# Patient Record
Sex: FEMALE | Race: WHITE | NOT HISPANIC OR LATINO | Employment: PART TIME | URBAN - METROPOLITAN AREA
[De-identification: names, ages, dates, MRNs, and addresses within clinical notes are randomized per-mention and may not be internally consistent; named-entity substitution may affect disease eponyms.]

---

## 2018-06-25 RX ORDER — ZINC GLUCONATE 50 MG
500 TABLET ORAL DAILY
COMMUNITY
Start: 2014-01-27

## 2018-06-25 RX ORDER — AMLODIPINE BESYLATE 10 MG/1
10 TABLET ORAL DAILY
COMMUNITY
Start: 2014-01-27

## 2018-06-25 RX ORDER — ALPRAZOLAM 0.25 MG/1
0.25 TABLET ORAL DAILY PRN
COMMUNITY
Start: 2014-01-28

## 2018-06-25 RX ORDER — ATORVASTATIN CALCIUM 80 MG/1
80 TABLET, FILM COATED ORAL DAILY
COMMUNITY
Start: 2014-01-27

## 2018-06-25 RX ORDER — CHOLECALCIFEROL (VITAMIN D3) 25 MCG
1000 TABLET ORAL DAILY
COMMUNITY
Start: 2014-01-28

## 2018-06-25 RX ORDER — FLUTICASONE PROPIONATE AND SALMETEROL 250; 50 UG/1; UG/1
2 POWDER RESPIRATORY (INHALATION) 2 TIMES DAILY
COMMUNITY
Start: 2016-05-12 | End: 2022-09-13

## 2018-06-25 RX ORDER — NITROGLYCERIN 0.4 MG/1
0.4 TABLET SUBLINGUAL AS NEEDED
COMMUNITY
Start: 2017-09-07 | End: 2018-10-22 | Stop reason: SDUPTHER

## 2018-06-25 RX ORDER — ISOSORBIDE MONONITRATE 30 MG/1
30 TABLET, EXTENDED RELEASE ORAL DAILY
COMMUNITY
Start: 2015-06-30

## 2018-06-25 RX ORDER — PHENOL/SODIUM PHENOLATE
20 AEROSOL, SPRAY (ML) MUCOUS MEMBRANE DAILY
COMMUNITY
Start: 2015-11-06

## 2018-06-25 RX ORDER — VITAMIN E (DL,TOCOPHERYL ACET) 90 MG
400 CAPSULE ORAL DAILY
COMMUNITY
Start: 2015-01-29 | End: 2019-06-25

## 2018-06-25 RX ORDER — ASPIRIN 81 MG/1
81 TABLET ORAL DAILY
COMMUNITY
Start: 2014-01-27

## 2018-10-23 ENCOUNTER — TELEPHONE (OUTPATIENT)
Dept: SCHEDULING | Facility: CLINIC | Age: 63
End: 2018-10-23

## 2018-10-23 DIAGNOSIS — I25.10 CORONARY ARTERY DISEASE INVOLVING NATIVE CORONARY ARTERY OF NATIVE HEART WITHOUT ANGINA PECTORIS: ICD-10-CM

## 2018-10-23 DIAGNOSIS — I10 ESSENTIAL HYPERTENSION: ICD-10-CM

## 2018-10-23 DIAGNOSIS — Z79.82 LONG TERM (CURRENT) USE OF ASPIRIN: ICD-10-CM

## 2018-10-23 DIAGNOSIS — R79.9 ABNORMAL BLOOD CHEMISTRY: ICD-10-CM

## 2018-10-23 DIAGNOSIS — R74.01 ELEVATED TRANSAMINASE LEVEL: ICD-10-CM

## 2018-10-23 DIAGNOSIS — Z79.899 ENCOUNTER FOR LONG-TERM (CURRENT) USE OF MEDICATIONS: ICD-10-CM

## 2018-10-23 DIAGNOSIS — R74.8 ELEVATED CPK: ICD-10-CM

## 2018-10-23 DIAGNOSIS — E78.00 HYPERCHOLESTEREMIA: Primary | ICD-10-CM

## 2018-10-23 PROBLEM — Z87.891 FORMER TOBACCO USE: Status: ACTIVE | Noted: 2018-10-23

## 2018-10-23 RX ORDER — NITROGLYCERIN 0.4 MG/1
0.4 TABLET SUBLINGUAL AS NEEDED
Qty: 25 TABLET | Refills: 1 | Status: SHIPPED | OUTPATIENT
Start: 2018-10-23 | End: 2021-07-30

## 2018-10-23 NOTE — TELEPHONE ENCOUNTER
Pt is requesting an order for lab work to be added to her chart then mailed to the home on file.      Pt upcoming appt is scheduled for 10/30  ty

## 2018-10-24 NOTE — TELEPHONE ENCOUNTER
Called patient to see what labs she is going to go to.  She thought she went to [facility] but all her other prior labs were [facility] we will therefore do an other slip and send it to her via her email at [email or web address] so she can print it and figure out which labs she is going to.

## 2018-11-19 ENCOUNTER — TELEPHONE (OUTPATIENT)
Dept: SCHEDULING | Facility: CLINIC | Age: 63
End: 2018-11-19

## 2018-11-28 ENCOUNTER — TELEPHONE (OUTPATIENT)
Dept: SCHEDULING | Facility: CLINIC | Age: 63
End: 2018-11-28

## 2018-11-28 NOTE — TELEPHONE ENCOUNTER
Juani pt - pt calling to discuss with Dr. Gloria if she is able to start to use CBD oil for chronic back pain.  Pt states that there may be interaction with her amlodipine (calcium channel blockers)  Please call pt to discuss at [phone]

## 2018-11-29 NOTE — TELEPHONE ENCOUNTER
CBD oil = cannabidiol. Did [employer] interaction analysis for amlodipine, atorvastatin, and cannabidiol and there are no interactions of risk level A or greater identified.      Checked the websites and they report that there are potential interactions if the drug is metabolized through the cytochrome P450 system as this would be more potent than grapefruit juice.  Will review with Dr. Gloria as to what he recommends.      Spoke to patient and she is at her wits end  because she does not want to have surgery and nothing seems to be helping her back.  She also does not want to take many of the narcotic analgesics that will be prescribed through [facility].

## 2018-11-29 NOTE — TELEPHONE ENCOUNTER
Left voicemail message for patient that Dr. Gloria does not think there are any contraindications to using CBD oil as the Lexicomp drug interaction revealed no  Interactions.    She can start slowly  And if she does develop lightheadedness or dizziness she chould check her BP to make sure duane is not dropping. She can report if she starts to develop muscle pains or cramping in her legs.

## 2019-01-24 ENCOUNTER — TELEPHONE (OUTPATIENT)
Dept: SCHEDULING | Facility: CLINIC | Age: 64
End: 2019-01-24

## 2019-01-24 RX ORDER — NITROGLYCERIN 400 UG/1
1 SPRAY ORAL EVERY 5 MIN PRN
Qty: 12 G | Refills: 0 | Status: SHIPPED | OUTPATIENT
Start: 2019-01-24 | End: 2020-01-27 | Stop reason: SDUPTHER

## 2019-01-24 NOTE — TELEPHONE ENCOUNTER
"FYI - Pt of Dr. Gloria, last seen in office 2017. Pt called office as her Nitroglycerin spray  and she likes to keep a bottle with her \"just in case.\" Also keeps sublingual nitroglycerin tablets in home.    I advised pt that I will send Rx for one bottle nitro spray to her local pharmacy, but that she will need to schedule a follow up with Dr. Gloria in office for additional refills. Stated that she has been having issues with her back, and had to cancel appts in July/Oct/2018. She promised she would call our scheduling team to arrange for a follow up with Dr. Gloria in office. Thank you.  "

## 2019-02-19 LAB
ALBUMIN SERPL-MCNC: 4.1 G/DL (ref 3.6–5.1)
ALBUMIN/GLOB SERPL: 1.8 (CALC) (ref 1–2.5)
ALP SERPL-CCNC: 46 U/L (ref 33–130)
ALT SERPL-CCNC: 21 U/L (ref 6–29)
AST SERPL-CCNC: 25 U/L (ref 10–35)
BASOPHILS # BLD AUTO: 60 CELLS/UL (ref 0–200)
BASOPHILS NFR BLD AUTO: 1 %
BILIRUB SERPL-MCNC: 0.6 MG/DL (ref 0.2–1.2)
BUN SERPL-MCNC: 12 MG/DL (ref 7–25)
BUN/CREAT SERPL: NORMAL (CALC) (ref 6–22)
CALCIUM SERPL-MCNC: 9.4 MG/DL (ref 8.6–10.4)
CHLORIDE SERPL-SCNC: 103 MMOL/L (ref 98–110)
CHOLEST SERPL-MCNC: 201 MG/DL
CHOLEST/HDLC SERPL: 2 (CALC)
CK SERPL-CCNC: 83 U/L (ref 29–143)
CO2 SERPL-SCNC: 31 MMOL/L (ref 20–32)
CREAT SERPL-MCNC: 0.69 MG/DL (ref 0.5–0.99)
EOSINOPHIL # BLD AUTO: 132 CELLS/UL (ref 15–500)
EOSINOPHIL NFR BLD AUTO: 2.2 %
ERYTHROCYTE [DISTWIDTH] IN BLOOD BY AUTOMATED COUNT: 12.8 % (ref 11–15)
GLOBULIN SER CALC-MCNC: 2.3 G/DL (CALC) (ref 1.9–3.7)
GLUCOSE SERPL-MCNC: 88 MG/DL (ref 65–99)
HCT VFR BLD AUTO: 44.2 % (ref 35–45)
HDLC SERPL-MCNC: 99 MG/DL
HGB BLD-MCNC: 15.3 G/DL (ref 11.7–15.5)
LDLC SERPL CALC-MCNC: 86 MG/DL (CALC)
LYMPHOCYTES # BLD AUTO: 1806 CELLS/UL (ref 850–3900)
LYMPHOCYTES NFR BLD AUTO: 30.1 %
MCH RBC QN AUTO: 31.5 PG (ref 27–33)
MCHC RBC AUTO-ENTMCNC: 34.6 G/DL (ref 32–36)
MCV RBC AUTO: 91.1 FL (ref 80–100)
MONOCYTES # BLD AUTO: 528 CELLS/UL (ref 200–950)
MONOCYTES NFR BLD AUTO: 8.8 %
NEUTROPHILS # BLD AUTO: 3474 CELLS/UL (ref 1500–7800)
NEUTROPHILS NFR BLD AUTO: 57.9 %
NONHDLC SERPL-MCNC: 102 MG/DL (CALC)
PLATELET # BLD AUTO: 325 THOUSAND/UL (ref 140–400)
PMV BLD REES-ECKER: 9.3 FL (ref 7.5–12.5)
POTASSIUM SERPL-SCNC: 4.1 MMOL/L (ref 3.5–5.3)
PROT SERPL-MCNC: 6.4 G/DL (ref 6.1–8.1)
QUEST EGFR NON-AFR. AMERICAN: 93 ML/MIN/1.73M2
RBC # BLD AUTO: 4.85 MILLION/UL (ref 3.8–5.1)
SODIUM SERPL-SCNC: 139 MMOL/L (ref 135–146)
TRIGL SERPL-MCNC: 71 MG/DL
WBC # BLD AUTO: 6 THOUSAND/UL (ref 3.8–10.8)

## 2019-05-28 ENCOUNTER — TELEPHONE (OUTPATIENT)
Dept: SCHEDULING | Facility: CLINIC | Age: 64
End: 2019-05-28

## 2019-05-28 NOTE — TELEPHONE ENCOUNTER
Pt requesting routine office visit with Dr. Gloria at the [facility] location.    Pt has 3 can in a row, ok to schedule?  Pt states any day is good for her after 1 pm.    Pt can be reached at [phone]

## 2019-06-25 ENCOUNTER — OFFICE VISIT (OUTPATIENT)
Dept: CARDIOLOGY | Facility: CLINIC | Age: 64
End: 2019-06-25
Payer: COMMERCIAL

## 2019-06-25 VITALS
SYSTOLIC BLOOD PRESSURE: 130 MMHG | HEIGHT: 65 IN | HEART RATE: 84 BPM | BODY MASS INDEX: 24.83 KG/M2 | WEIGHT: 149 LBS | RESPIRATION RATE: 15 BRPM | DIASTOLIC BLOOD PRESSURE: 70 MMHG

## 2019-06-25 DIAGNOSIS — I21.3 ST ELEVATION MYOCARDIAL INFARCTION (STEMI), UNSPECIFIED ARTERY (CMS/HCC): ICD-10-CM

## 2019-06-25 DIAGNOSIS — Z91.041 ALLERGY TO INTRAVENOUS CONTRAST MEDIA: ICD-10-CM

## 2019-06-25 DIAGNOSIS — I25.2 OLD MYOCARDIAL INFARCTION: ICD-10-CM

## 2019-06-25 DIAGNOSIS — E78.00 HYPERCHOLESTEROLEMIA: ICD-10-CM

## 2019-06-25 DIAGNOSIS — I25.10 ATHEROSCLEROSIS OF NATIVE CORONARY ARTERY OF NATIVE HEART WITHOUT ANGINA PECTORIS: Primary | ICD-10-CM

## 2019-06-25 DIAGNOSIS — I10 ESSENTIAL HYPERTENSION: ICD-10-CM

## 2019-06-25 DIAGNOSIS — Z87.891 FORMER TOBACCO USE: ICD-10-CM

## 2019-06-25 PROCEDURE — 93000 ELECTROCARDIOGRAM COMPLETE: CPT | Performed by: INTERNAL MEDICINE

## 2019-06-25 PROCEDURE — 99214 OFFICE O/P EST MOD 30 MIN: CPT | Performed by: INTERNAL MEDICINE

## 2019-06-25 ASSESSMENT — ENCOUNTER SYMPTOMS
ABDOMINAL DISTENTION: 0
LIGHT-HEADEDNESS: 0
DIZZINESS: 0
BRUISES/BLEEDS EASILY: 0
ABDOMINAL PAIN: 0
BACK PAIN: 1
BLOOD IN STOOL: 0
HEMATURIA: 0
ANAL BLEEDING: 0

## 2019-06-25 NOTE — LETTER
Rojas Gloria MD, [facility]  Director, Clinical Cardiology -  [facility]  and [facility]  The [employer] Chair in Innovative Cardiology  Clinical Associate Professor of Medicine  [facility]    [facility]  [facility] [address]  [address] [place] [address]    TEL  [phone]  [email or web address]      2019    Jayson London MD   [address] [place] [address]        Re: Melly Liu  : 1955      Dear Dr. London,      I saw Melly Iza in my office today on 2019.    She is a 64-year-old former smoker with coronary artery disease status post 2 small non-STEMI's (3/10 & 12/10) and episode of ST elevation associated coronary spasm with entirely normal coronary arteries and small troponin leak in ('15), hypercholesterolemia, and a history of an iodinated contrast allergy  who presents today for her 18-month follow-up visit.    She reports no chest pain at rest or with physical activities over the last 18 months.    She has subsequently undergone back surgery at [facility] which has improved but not completely and eliminated her back pain.  She is performing water aerobics in her pool at home and still undergoing physical therapy.    She has been taking her atorvastatin regularly without side effects such as myalgias or significant muscle cramping.    I have reviewed the problem list, allergies, medications and social history, and they are up to date in the electronic record as of the current encounter.     ROS: The remainder of the review of systems is negative in detail for significant complaints.    No changes in her medications have been made since the last visit.    Allergies   Allergen Reactions   • Iodine And Iodide Containing Products Shortness of breath and Angioedema      "tolerates IV dye with steroid prep (IV Decadron)   • Ramipril      Other reaction(s): Hypotension   • Shellfish Derived      Other reaction(s):  swelling, throat closing       Physical Exam:  Vital Signs: /70   Pulse 84   Resp 15   Ht 1.651 m (5' 5\")   Wt 67.6 kg (149 lb)   BMI 24.79 kg/m²   General: well-developed, well-nourished, appears well, no acute distress  HEENT: sclera anicteric, conjunctiva pink, no xanthelasma, neck supple, no thyromegaly  Chest: clear to auscultation, symmetrical expansion, no scoliosis   Heart: Apical impulse normally situated , regular rhythm, normal S1, normal S2, no gallops, no murmur  JVP: normal jugular venous pressure, negative AJR  Vascular: carotid pulses: intact bilaterally, no bruit, peripheral pulses: intact bilaterally  Abd: soft, non-tender, no hepatosplenomegaly, abdominal aorta not palpable  Ext: no edema, no clubbing, no cyanosis  Skin: warm, dry, no ecchymoses  Neuro: alert, oriented x 3, normal muscle strength  Psychiatric: normal affect, normal judgment, normal insight and normal memory      LABS:     Ref. Range 2/18/2019 11:37   Sodium Latest Ref Range: 135 - 146 mmol/L 139   Potassium Latest Ref Range: 3.5 - 5.3 mmol/L 4.1   Chloride Latest Ref Range: 98 - 110 mmol/L 103   CO2 Latest Ref Range: 20 - 32 mmol/L 31   BUN Latest Ref Range: 7 - 25 mg/dL 12   Creatinine Latest Ref Range: 0.50 - 0.99 mg/dL 0.69   Glucose Latest Ref Range: 65 - 99 mg/dL 88   Calcium Latest Ref Range: 8.6 - 10.4 mg/dL 9.4   AST (SGOT) Latest Ref Range: 10 - 35 U/L 25   ALT (SGPT) Latest Ref Range: 6 - 29 U/L 21   Alkaline Phosphatase Latest Ref Range: 33 - 130 U/L 46   Total Protein Latest Ref Range: 6.1 - 8.1 g/dL 6.4   Albumin Latest Ref Range: 3.6 - 5.1 g/dL 4.1   Bilirubin, Total Latest Ref Range: 0.2 - 1.2 mg/dL 0.6   eGFR Latest Ref Range: > OR = 60 mL/min/1.73m2 93   Bun/Creatinine Ratio Latest Ref Range: 6 - 22 (calc) NOT APPLICABLE   Cholesterol Latest Ref Range: <200 " mg/dL 201 (H)   Triglycerides Latest Ref Range: <150 mg/dL 71   HDL Latest Ref Range: >50 mg/dL 99   LDL Calculated Latest Units: mg/dL (calc) 86   Non-HDL, Calculated Latest Ref Range: <130 mg/dL (calc) 102   Chol/Hdlc Ratio Latest Ref Range: <5.0 (calc) 2.0   ALBUMIN/GLOBULIN RATIO Latest Ref Range: 1.0 - 2.5 (calc) 1.8   WBC Latest Ref Range: 3.8 - 10.8 Thousand/uL 6.0   RBC Latest Ref Range: 3.80 - 5.10 Million/uL 4.85   Hemoglobin Latest Ref Range: 11.7 - 15.5 g/dL 15.3   Hematocrit Latest Ref Range: 35.0 - 45.0 % 44.2   MCV Latest Ref Range: 80.0 - 100.0 fL 91.1   MCH Latest Ref Range: 27.0 - 33.0 pg 31.5   MCHC Latest Ref Range: 32.0 - 36.0 g/dL 34.6   RDW Latest Ref Range: 11.0 - 15.0 % 12.8   Platelets Latest Ref Range: 140 - 400 Thousand/uL 325   Basophils Latest Units: % 1.0   Creatine Kinase, Total Latest Ref Range: 29 - 143 U/L 83         ECG:  The electrocardiogram obtained today 6/25/19 revealed Sinus  Rhythm at a rate of 84.  Poor R wave progression consistent with lead placement artifact.  When compared with the most recent ECG of 12/29/17 there was no significant change.    Impression And Plan:  Coronary vasospasm-status post non-STEMI’s 3/10, 12/10, 6/15:   I suspect that all of her events were related to episodes of coronary vasospasm with normal coronary arteries. She has been free of recurrent events for the last the last year and a half on her current regimen which will be continued including amlodipine and isosorbide mononitrate. She will also continue aspirin and lipid-lowering therapy.    Hypercholesterolemia:   Her lipids are acceptable in the absence of atherosclerotic findings of her coronary arteries.  She is already on a maximal dose of atorvastatin.    Hypertension:  Blood pressure is under good control. Will continue current medications.    Iodinated contrast allergy:   She tolerated her angiograms with steroid pretreatment. This is noted in her medical record.      No changes were  made in her regimen today.  Listed below you will find the regimen that she will be requested to continue:      Current Outpatient Prescriptions:   •  ALPRAZolam (XANAX) 0.25 mg tablet, Take 0.25 mg by mouth daily as needed., Disp: , Rfl:   •  amLODIPine (NORVASC) 5 mg tablet, Take 5 mg by mouth daily., Disp: , Rfl:   •  aspirin 81 mg enteric coated tablet, Take 81 mg by mouth daily., Disp: , Rfl:   •  atorvastatin (LIPITOR) 80 mg tablet, Take 80 mg by mouth daily., Disp: , Rfl:   •  cholecalciferol, vitamin D3, (cholecalciferol) 1,000 unit tablet, Take 1,000 Units by mouth daily., Disp: , Rfl:   •  fluticasone-salmeterol (ADVAIR DISKUS) 250-50 mcg/dose diskus inhaler, Inhale 2 puffs 2 (two) times a day.  , Disp: , Rfl:   •  isosorbide mononitrate (IMDUR) 30 mg 24 hr tablet, Take 15 mg by mouth daily.  , Disp: , Rfl:   •  magnesium oxide 500 mg capsule, Take 250 mg by mouth daily., Disp: , Rfl:   •  nitroglycerin (NITROLINGUAL) 400 mcg/spray spray, Place 1 spray under the tongue every 5 (five) minutes as needed for chest pain., Disp: 12 g, Rfl: 0  •  nitroglycerin (NITROSTAT) 0.4 mg SL tablet, Place 1 tablet (0.4 mg total) under the tongue as needed for chest pain. Place under the tongue every 5 minutes as needed for chest pain, Disp: 25 tablet, Rfl: 1  •  omeprazole 20 mg tablet, Take 20 mg by mouth daily., Disp: , Rfl:        If there are no new interval cardiovascular problems, I will see her again in 6 months.    I sincerely appreciate the opportunity to participate in the care of your patients. Please do not hesitate to contact me if any questions or problems arise.     With best wishes and warmest personal regards.      Sincerely,        thai Gloria MD, [facility]    This document was generated utilizing voice recognition technology. A reasonable attempt at proofreading has been made to minimize errors but please excuse any typographical errors which may be present. Please call with any questions.

## 2019-06-25 NOTE — PROGRESS NOTES
"Subjective      Patient ID: Melly Couch is a 64 y.o. female.  1955      HPI    The following have been reviewed and updated as appropriate in this visit:  Tobacco  Allergies  Meds  Problems  Med Hx  Surg Hx  Fam Hx  Soc Hx        Review of Systems   Constitutional:        Weight unchanged since her last visit   HENT: Negative for nosebleeds.    Cardiovascular: Negative for chest pain and leg swelling.   Gastrointestinal: Negative for abdominal distention, abdominal pain, anal bleeding and blood in stool.   Genitourinary: Negative for hematuria.   Musculoskeletal: Positive for back pain (improved since back surgery).   Neurological: Negative for dizziness and light-headedness.   Hematological: Does not bruise/bleed easily.       Objective     Vitals:    06/25/19 1339   Pulse: 84   Resp: 15   Weight: 67.6 kg (149 lb)   Height: 1.651 m (5' 5\")     Body mass index is 24.79 kg/m².    Physical Exam    Assessment/Plan   Diagnoses and all orders for this visit:    Atherosclerosis of native coronary artery of native heart without angina pectoris (Primary)    Essential hypertension  -     ECG 12 lead    Old myocardial infarction    ST elevation myocardial infarction (STEMI), unspecified artery (CMS/HCC)    Hypercholesterolemia    Allergy to intravenous contrast media    Former tobacco use        "

## 2019-06-25 NOTE — PROGRESS NOTES
Rojas Gloria MD, [facility]  Director, Clinical Cardiology -  [facility]  and [facility]  The Carlos Perkins III Chair in Innovative Cardiology  Clinical Associate Professor of Medicine  [facility]    [facility]  The [facility] [address]  [address] [facility] [address]    TEL  [phone]  [email or web address]      2019    Jayson London MD   [address] [place] [address]        Re: Melly Liu  : 1955      Dear Dr. London,      I saw Melly Iza in my office today on 2019.    She is a 64-year-old former smoker with coronary artery disease status post 2 small non-STEMI's (3/10 & 12/10) and episode of ST elevation associated coronary spasm with entirely normal coronary arteries and small troponin leak in ('15), hypercholesterolemia, and a history of an iodinated contrast allergy  who presents today for her 18-month follow-up visit.    She reports no chest pain at rest or with physical activities over the last 18 months.    She has subsequently undergone back surgery at [place] which has improved but not completely and eliminated her back pain.  She is performing water aerobics in her pool at home and still undergoing physical therapy.    She has been taking her atorvastatin regularly without side effects such as myalgias or significant muscle cramping.    I have reviewed the problem list, allergies, medications and social history, and they are up to date in the electronic record as of the current encounter.     ROS: The remainder of the review of systems is negative in detail for significant complaints.    No changes in her medications have been made since the last visit.    Allergies   Allergen Reactions   • Iodine And Iodide Containing Products Shortness of breath and Angioedema      "tolerates IV dye with steroid prep (IV Decadron)   • Ramipril      Other reaction(s): Hypotension   • Shellfish Derived      Other reaction(s):  swelling, throat closing       Physical Exam:  Vital Signs: /70   Pulse 84   Resp 15   Ht 1.651 m (5' 5\")   Wt 67.6 kg (149 lb)   BMI 24.79 kg/m²   General: well-developed, well-nourished, appears well, no acute distress  HEENT: sclera anicteric, conjunctiva pink, no xanthelasma, neck supple, no thyromegaly  Chest: clear to auscultation, symmetrical expansion, no scoliosis   Heart: Apical impulse normally situated , regular rhythm, normal S1, normal S2, no gallops, no murmur  JVP: normal jugular venous pressure, negative AJR  Vascular: carotid pulses: intact bilaterally, no bruit, peripheral pulses: intact bilaterally  Abd: soft, non-tender, no hepatosplenomegaly, abdominal aorta not palpable  Ext: no edema, no clubbing, no cyanosis  Skin: warm, dry, no ecchymoses  Neuro: alert, oriented x 3, normal muscle strength  Psychiatric: normal affect, normal judgment, normal insight and normal memory      LABS:     Ref. Range 2/18/2019 11:37   Sodium Latest Ref Range: 135 - 146 mmol/L 139   Potassium Latest Ref Range: 3.5 - 5.3 mmol/L 4.1   Chloride Latest Ref Range: 98 - 110 mmol/L 103   CO2 Latest Ref Range: 20 - 32 mmol/L 31   BUN Latest Ref Range: 7 - 25 mg/dL 12   Creatinine Latest Ref Range: 0.50 - 0.99 mg/dL 0.69   Glucose Latest Ref Range: 65 - 99 mg/dL 88   Calcium Latest Ref Range: 8.6 - 10.4 mg/dL 9.4   AST (SGOT) Latest Ref Range: 10 - 35 U/L 25   ALT (SGPT) Latest Ref Range: 6 - 29 U/L 21   Alkaline Phosphatase Latest Ref Range: 33 - 130 U/L 46   Total Protein Latest Ref Range: 6.1 - 8.1 g/dL 6.4   Albumin Latest Ref Range: 3.6 - 5.1 g/dL 4.1   Bilirubin, Total Latest Ref Range: 0.2 - 1.2 mg/dL 0.6   eGFR Latest Ref Range: > OR = 60 mL/min/1.73m2 93   Bun/Creatinine Ratio Latest Ref Range: 6 - 22 (calc) NOT APPLICABLE   Cholesterol Latest Ref Range: <200 " mg/dL 201 (H)   Triglycerides Latest Ref Range: <150 mg/dL 71   HDL Latest Ref Range: >50 mg/dL 99   LDL Calculated Latest Units: mg/dL (calc) 86   Non-HDL, Calculated Latest Ref Range: <130 mg/dL (calc) 102   Chol/Hdlc Ratio Latest Ref Range: <5.0 (calc) 2.0   ALBUMIN/GLOBULIN RATIO Latest Ref Range: 1.0 - 2.5 (calc) 1.8   WBC Latest Ref Range: 3.8 - 10.8 Thousand/uL 6.0   RBC Latest Ref Range: 3.80 - 5.10 Million/uL 4.85   Hemoglobin Latest Ref Range: 11.7 - 15.5 g/dL 15.3   Hematocrit Latest Ref Range: 35.0 - 45.0 % 44.2   MCV Latest Ref Range: 80.0 - 100.0 fL 91.1   MCH Latest Ref Range: 27.0 - 33.0 pg 31.5   MCHC Latest Ref Range: 32.0 - 36.0 g/dL 34.6   RDW Latest Ref Range: 11.0 - 15.0 % 12.8   Platelets Latest Ref Range: 140 - 400 Thousand/uL 325   Basophils Latest Units: % 1.0   Creatine Kinase, Total Latest Ref Range: 29 - 143 U/L 83         ECG:  The electrocardiogram obtained today 6/25/19 revealed Sinus  Rhythm at a rate of 84.  Poor R wave progression consistent with lead placement artifact.  When compared with the most recent ECG of 12/29/17 there was no significant change.    Impression And Plan:  Coronary vasospasm-status post non-STEMI’s 3/10, 12/10, 6/15:   I suspect that all of her events were related to episodes of coronary vasospasm with normal coronary arteries. She has been free of recurrent events for the last the last year and a half on her current regimen which will be continued including amlodipine and isosorbide mononitrate. She will also continue aspirin and lipid-lowering therapy.    Hypercholesterolemia:   Her lipids are acceptable in the absence of atherosclerotic findings of her coronary arteries.  She is already on a maximal dose of atorvastatin.    Hypertension:  Blood pressure is under good control. Will continue current medications.    Iodinated contrast allergy:   She tolerated her angiograms with steroid pretreatment. This is noted in her medical record.      No changes were  made in her regimen today.  Listed below you will find the regimen that she will be requested to continue:      Current Outpatient Prescriptions:   •  ALPRAZolam (XANAX) 0.25 mg tablet, Take 0.25 mg by mouth daily as needed., Disp: , Rfl:   •  amLODIPine (NORVASC) 5 mg tablet, Take 5 mg by mouth daily., Disp: , Rfl:   •  aspirin 81 mg enteric coated tablet, Take 81 mg by mouth daily., Disp: , Rfl:   •  atorvastatin (LIPITOR) 80 mg tablet, Take 80 mg by mouth daily., Disp: , Rfl:   •  cholecalciferol, vitamin D3, (cholecalciferol) 1,000 unit tablet, Take 1,000 Units by mouth daily., Disp: , Rfl:   •  fluticasone-salmeterol (ADVAIR DISKUS) 250-50 mcg/dose diskus inhaler, Inhale 2 puffs 2 (two) times a day.  , Disp: , Rfl:   •  isosorbide mononitrate (IMDUR) 30 mg 24 hr tablet, Take 15 mg by mouth daily.  , Disp: , Rfl:   •  magnesium oxide 500 mg capsule, Take 250 mg by mouth daily., Disp: , Rfl:   •  nitroglycerin (NITROLINGUAL) 400 mcg/spray spray, Place 1 spray under the tongue every 5 (five) minutes as needed for chest pain., Disp: 12 g, Rfl: 0  •  nitroglycerin (NITROSTAT) 0.4 mg SL tablet, Place 1 tablet (0.4 mg total) under the tongue as needed for chest pain. Place under the tongue every 5 minutes as needed for chest pain, Disp: 25 tablet, Rfl: 1  •  omeprazole 20 mg tablet, Take 20 mg by mouth daily., Disp: , Rfl:        If there are no new interval cardiovascular problems, I will see her again in 6 months.    I sincerely appreciate the opportunity to participate in the care of your patients. Please do not hesitate to contact me if any questions or problems arise.     With best wishes and warmest personal regards.      Sincerely,        thai Gloria MD, [facility]    This document was generated utilizing voice recognition technology. A reasonable attempt at proofreading has been made to minimize errors but please excuse any typographical errors which may be present. Please call with any questions.

## 2020-01-24 NOTE — PROGRESS NOTES
Rojas Gloria MD, [facility]  Director, Clinical Cardiology -  [facility]  and [facility]  The Carlos Perkins III Chair in Innovative Cardiology  Clinical Associate Professor of Medicine  The [facility] HE[facility]  The [facility] [address]  [address], [facility] [place] [address]    TEL  [phone]  [email or web address]      2020      Jayson London MD   [address] [place] [address]        Re: Melly Liu  : 1955      Dear Dr. London,      I saw Melly Iza in my office today on 2020.      She is a 64-year-old former smoker with coronary artery disease status post 2 small non-STEMI's (3/10 & 12/10) and episode of ST elevation associated coronary spasm with entirely normal coronary arteries and small troponin leak in ('15), hypercholesterolemia, and a history of an iodinated contrast allergy  who presents today for her 6-month follow-up visit.    She reports no chest pain at rest or with physical activities over the last 6 months.  She has not experienced any further coronary events over the last 5 years.    She continues to be troubled by back pain and recently returned to the swimming pool to begin exercising routinely.    She has been taking her atorvastatin regularly without side effects such as myalgias or significant muscle cramping.    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    I have reviewed the problem list, allergies, medications and social history, and they are up to date in the electronic record as of the current encounter.     ROS: The remainder of the review of systems is negative in detail for significant complaints.    No changes in her medications have been made since the last visit.    Allergies   Allergen Reactions  "  • Iodine And Iodide Containing Products Shortness of breath and Angioedema     tolerates IV dye with steroid prep (IV Decadron)   • Ramipril      Other reaction(s): Hypotension   • Shellfish Derived      Other reaction(s):  swelling, throat closing       Physical Exam:  Visit Vitals  /82   Pulse 84   Resp 16   Ht 1.676 m (5' 6\")   Wt 69.9 kg (154 lb)   BMI 24.86 kg/m²     General: well-developed, well-nourished, appears well, no acute distress  HEENT: sclera anicteric, conjunctiva pink, no xanthelasma, neck supple, no thyromegaly  Chest: clear to auscultation, symmetrical expansion, no scoliosis   Heart: Apical impulse normally situated , regular rhythm, normal S1, normal S2, no gallops, no murmur  JVP: normal jugular venous pressure, negative AJR  Vascular: carotid pulses: intact bilaterally, no bruit, peripheral pulses: intact bilaterally  Abd: soft, non-tender, no hepatosplenomegaly, abdominal aorta not palpable  Ext: no edema, no clubbing, no cyanosis  Skin: warm, dry, no ecchymoses  Neuro: alert, oriented x 3, normal muscle strength  Psychiatric: normal affect, normal judgment, normal insight and normal memory      LABS:     Ref. Range 2/18/2019 11:37   Sodium Latest Ref Range: 135 - 146 mmol/L 139   Potassium Latest Ref Range: 3.5 - 5.3 mmol/L 4.1   Chloride Latest Ref Range: 98 - 110 mmol/L 103   CO2 Latest Ref Range: 20 - 32 mmol/L 31   BUN Latest Ref Range: 7 - 25 mg/dL 12   Creatinine Latest Ref Range: 0.50 - 0.99 mg/dL 0.69   Glucose Latest Ref Range: 65 - 99 mg/dL 88   Calcium Latest Ref Range: 8.6 - 10.4 mg/dL 9.4   AST (SGOT) Latest Ref Range: 10 - 35 U/L 25   ALT (SGPT) Latest Ref Range: 6 - 29 U/L 21   Alkaline Phosphatase Latest Ref Range: 33 - 130 U/L 46   Total Protein Latest Ref Range: 6.1 - 8.1 g/dL 6.4   Albumin Latest Ref Range: 3.6 - 5.1 g/dL 4.1   Bilirubin, Total Latest Ref Range: 0.2 - 1.2 mg/dL 0.6   eGFR Latest Ref Range: > OR = 60 mL/min/1.73m2 93   Bun/Creatinine Ratio Latest " Ref Range: 6 - 22 (calc) NOT APPLICABLE   Cholesterol Latest Ref Range: <200 mg/dL 201 (H)   Triglycerides Latest Ref Range: <150 mg/dL 71   HDL Latest Ref Range: >50 mg/dL 99   LDL Calculated Latest Units: mg/dL (calc) 86   Non-HDL, Calculated Latest Ref Range: <130 mg/dL (calc) 102   Chol/Hdlc Ratio Latest Ref Range: <5.0 (calc) 2.0   ALBUMIN/GLOBULIN RATIO Latest Ref Range: 1.0 - 2.5 (calc) 1.8   WBC Latest Ref Range: 3.8 - 10.8 Thousand/uL 6.0   RBC Latest Ref Range: 3.80 - 5.10 Million/uL 4.85   Hemoglobin Latest Ref Range: 11.7 - 15.5 g/dL 15.3   Hematocrit Latest Ref Range: 35.0 - 45.0 % 44.2   MCV Latest Ref Range: 80.0 - 100.0 fL 91.1   MCH Latest Ref Range: 27.0 - 33.0 pg 31.5   MCHC Latest Ref Range: 32.0 - 36.0 g/dL 34.6   RDW Latest Ref Range: 11.0 - 15.0 % 12.8   Platelets Latest Ref Range: 140 - 400 Thousand/uL 325   Basophils Latest Units: % 1.0   Creatine Kinase, Total Latest Ref Range: 29 - 143 U/L 83     ECG:      Impression And Plan:  Coronary vasospasm-status post non-STEMI’s 3/10, 12/10, 6/15:   I suspect that all of her events were related to episodes of coronary vasospasm with normal coronary arteries. She has been free of recurrent events for the last the last year and a half on her current regimen which will be continued including amlodipine and isosorbide mononitrate. She will also continue aspirin and lipid-lowering therapy.    Hypercholesterolemia:   Tolerating lipid lowering therapy without significant side effects. Follow-up laboratory studies are due to reexamine the lipid profile and liver function studies. A requisition was provided today to obtain that lab work prior to the return visit. Those results will be reviewed when they are received and the results will be communicated to the patient.    Hypertension:  Her blood pressure is borderline elevated today.   I told her to monitor her blood pressures at home periodically and average systolic and diastolic blood pressures taken  morning and evening for a week. If the average of those pressures exceed 130/80 she should contact this office for further recommendations in regard to blood pressure management.    Iodinated contrast allergy:   She tolerated her angiograms with steroid pretreatment. This is noted in her medical record.    No changes were made in her regimen today.  Listed below you will find the regimen that she will be requested to continue:      Current Outpatient Medications:   •  ALPRAZolam (XANAX) 0.25 mg tablet, Take 0.25 mg by mouth daily as needed., Disp: , Rfl:   •  amLODIPine (NORVASC) 5 mg tablet, Take 5 mg by mouth daily., Disp: , Rfl:   •  aspirin 81 mg enteric coated tablet, Take 81 mg by mouth daily., Disp: , Rfl:   •  atorvastatin (LIPITOR) 80 mg tablet, Take 80 mg by mouth daily., Disp: , Rfl:   •  cholecalciferol, vitamin D3, (cholecalciferol) 1,000 unit tablet, Take 1,000 Units by mouth daily., Disp: , Rfl:   •  isosorbide mononitrate (IMDUR) 30 mg 24 hr tablet, Take 15 mg by mouth daily.  , Disp: , Rfl:   •  magnesium oxide 500 mg capsule, Take 250 mg by mouth daily., Disp: , Rfl:   •  nitroglycerin (NITROLINGUAL) 400 mcg/spray spray, Place 1 spray under the tongue every 5 (five) minutes as needed for chest pain., Disp: 12 g, Rfl: 0  •  omeprazole 20 mg tablet, Take 20 mg by mouth daily., Disp: , Rfl:   •  fluticasone-salmeterol (ADVAIR DISKUS) 250-50 mcg/dose diskus inhaler, Inhale 2 puffs 2 (two) times a day.  , Disp: , Rfl:   •  nitroglycerin (NITROSTAT) 0.4 mg SL tablet, Place 1 tablet (0.4 mg total) under the tongue as needed for chest pain. Place under the tongue every 5 minutes as needed for chest pain (Patient not taking: Reported on 1/27/2020 ), Disp: 25 tablet, Rfl: 1       If there are no new interval cardiovascular problems, I will see her again in 6 months.    I sincerely appreciate the opportunity to participate in the care of your patients. Please do not hesitate to contact me if any questions or  problems arise.     With best wishes and warmest personal regards.      Sincerely,        m  Rojas Gloria MD, [facility]    This document was generated utilizing voice recognition technology. A reasonable attempt at proofreading has been made to minimize errors but please excuse any typographical errors which may be present. Please call with any questions.

## 2020-01-27 ENCOUNTER — OFFICE VISIT (OUTPATIENT)
Dept: CARDIOLOGY | Facility: CLINIC | Age: 65
End: 2020-01-27
Payer: COMMERCIAL

## 2020-01-27 VITALS
DIASTOLIC BLOOD PRESSURE: 82 MMHG | RESPIRATION RATE: 16 BRPM | HEIGHT: 66 IN | SYSTOLIC BLOOD PRESSURE: 140 MMHG | BODY MASS INDEX: 24.75 KG/M2 | HEART RATE: 84 BPM | WEIGHT: 154 LBS

## 2020-01-27 DIAGNOSIS — I10 ESSENTIAL HYPERTENSION: ICD-10-CM

## 2020-01-27 DIAGNOSIS — I25.10 ATHEROSCLEROSIS OF NATIVE CORONARY ARTERY OF NATIVE HEART WITHOUT ANGINA PECTORIS: ICD-10-CM

## 2020-01-27 DIAGNOSIS — E78.00 HYPERCHOLESTEROLEMIA: ICD-10-CM

## 2020-01-27 DIAGNOSIS — I25.2 OLD MYOCARDIAL INFARCTION: ICD-10-CM

## 2020-01-27 DIAGNOSIS — R00.2 PALPITATIONS: Primary | ICD-10-CM

## 2020-01-27 DIAGNOSIS — I21.3 ST ELEVATION MYOCARDIAL INFARCTION (STEMI), UNSPECIFIED ARTERY (CMS/HCC): ICD-10-CM

## 2020-01-27 DIAGNOSIS — Z91.041 ALLERGY TO INTRAVENOUS CONTRAST MEDIA: ICD-10-CM

## 2020-01-27 PROCEDURE — 93000 ELECTROCARDIOGRAM COMPLETE: CPT | Performed by: INTERNAL MEDICINE

## 2020-01-27 PROCEDURE — 99214 OFFICE O/P EST MOD 30 MIN: CPT | Performed by: INTERNAL MEDICINE

## 2020-01-27 RX ORDER — NITROGLYCERIN 400 UG/1
1 SPRAY ORAL EVERY 5 MIN PRN
Qty: 12 G | Refills: 0 | Status: SHIPPED | OUTPATIENT
Start: 2020-01-27 | End: 2020-10-02 | Stop reason: ENTERED-IN-ERROR

## 2020-01-27 NOTE — LETTER
Rojas Gloria MD, [facility]  Director, Clinical Cardiology -  [facility]  and [facility]  The Carlos Perkins III Chair in Innovative Cardiology  Clinical Associate Professor of Medicine  The [facility] HEART [facility]  The Heart [address]  [address] [facility] [place] [address]    TEL  [phone]  [email or web address]      2020      Jayson London MD   [address] [place] [address]        Re: Melly Liu  : 1955      Dear Dr. London,      I saw Melly Iza in my office today on 2020.      She is a 64-year-old former smoker with coronary artery disease status post 2 small non-STEMI's (3/10 & 12/10) and episode of ST elevation associated coronary spasm with entirely normal coronary arteries and small troponin leak in ('15), hypercholesterolemia, and a history of an iodinated contrast allergy  who presents today for her 6-month follow-up visit.    She reports no chest pain at rest or with physical activities over the last 6 months.  She has not experienced any further coronary events over the last 5 years.    She continues to be troubled by back pain and recently returned to the swimming pool to begin exercising routinely.    She has been taking her atorvastatin regularly without side effects such as myalgias or significant muscle cramping.    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    I have reviewed the problem list, allergies, medications and social history, and they are up to date in the electronic record as of the current encounter.     ROS: The remainder of the review of systems is negative in detail for significant complaints.    No changes in her medications have been made since the last visit.    Allergies   Allergen Reactions  "  • Iodine And Iodide Containing Products Shortness of breath and Angioedema     tolerates IV dye with steroid prep (IV Decadron)   • Ramipril      Other reaction(s): Hypotension   • Shellfish Derived      Other reaction(s):  swelling, throat closing       Physical Exam:  Visit Vitals  /82   Pulse 84   Resp 16   Ht 1.676 m (5' 6\")   Wt 69.9 kg (154 lb)   BMI 24.86 kg/m²     General: well-developed, well-nourished, appears well, no acute distress  HEENT: sclera anicteric, conjunctiva pink, no xanthelasma, neck supple, no thyromegaly  Chest: clear to auscultation, symmetrical expansion, no scoliosis   Heart: Apical impulse normally situated , regular rhythm, normal S1, normal S2, no gallops, no murmur  JVP: normal jugular venous pressure, negative AJR  Vascular: carotid pulses: intact bilaterally, no bruit, peripheral pulses: intact bilaterally  Abd: soft, non-tender, no hepatosplenomegaly, abdominal aorta not palpable  Ext: no edema, no clubbing, no cyanosis  Skin: warm, dry, no ecchymoses  Neuro: alert, oriented x 3, normal muscle strength  Psychiatric: normal affect, normal judgment, normal insight and normal memory      LABS:     Ref. Range 2/18/2019 11:37   Sodium Latest Ref Range: 135 - 146 mmol/L 139   Potassium Latest Ref Range: 3.5 - 5.3 mmol/L 4.1   Chloride Latest Ref Range: 98 - 110 mmol/L 103   CO2 Latest Ref Range: 20 - 32 mmol/L 31   BUN Latest Ref Range: 7 - 25 mg/dL 12   Creatinine Latest Ref Range: 0.50 - 0.99 mg/dL 0.69   Glucose Latest Ref Range: 65 - 99 mg/dL 88   Calcium Latest Ref Range: 8.6 - 10.4 mg/dL 9.4   AST (SGOT) Latest Ref Range: 10 - 35 U/L 25   ALT (SGPT) Latest Ref Range: 6 - 29 U/L 21   Alkaline Phosphatase Latest Ref Range: 33 - 130 U/L 46   Total Protein Latest Ref Range: 6.1 - 8.1 g/dL 6.4   Albumin Latest Ref Range: 3.6 - 5.1 g/dL 4.1   Bilirubin, Total Latest Ref Range: 0.2 - 1.2 mg/dL 0.6   eGFR Latest Ref Range: > OR = 60 mL/min/1.73m2 93   Bun/Creatinine Ratio Latest " Ref Range: 6 - 22 (calc) NOT APPLICABLE   Cholesterol Latest Ref Range: <200 mg/dL 201 (H)   Triglycerides Latest Ref Range: <150 mg/dL 71   HDL Latest Ref Range: >50 mg/dL 99   LDL Calculated Latest Units: mg/dL (calc) 86   Non-HDL, Calculated Latest Ref Range: <130 mg/dL (calc) 102   Chol/Hdlc Ratio Latest Ref Range: <5.0 (calc) 2.0   ALBUMIN/GLOBULIN RATIO Latest Ref Range: 1.0 - 2.5 (calc) 1.8   WBC Latest Ref Range: 3.8 - 10.8 Thousand/uL 6.0   RBC Latest Ref Range: 3.80 - 5.10 Million/uL 4.85   Hemoglobin Latest Ref Range: 11.7 - 15.5 g/dL 15.3   Hematocrit Latest Ref Range: 35.0 - 45.0 % 44.2   MCV Latest Ref Range: 80.0 - 100.0 fL 91.1   MCH Latest Ref Range: 27.0 - 33.0 pg 31.5   MCHC Latest Ref Range: 32.0 - 36.0 g/dL 34.6   RDW Latest Ref Range: 11.0 - 15.0 % 12.8   Platelets Latest Ref Range: 140 - 400 Thousand/uL 325   Basophils Latest Units: % 1.0   Creatine Kinase, Total Latest Ref Range: 29 - 143 U/L 83     ECG:      Impression And Plan:  Coronary vasospasm-status post non-STEMI’s 3/10, 12/10, 6/15:   I suspect that all of her events were related to episodes of coronary vasospasm with normal coronary arteries. She has been free of recurrent events for the last the last year and a half on her current regimen which will be continued including amlodipine and isosorbide mononitrate. She will also continue aspirin and lipid-lowering therapy.    Hypercholesterolemia:   Tolerating lipid lowering therapy without significant side effects. Follow-up laboratory studies are due to reexamine the lipid profile and liver function studies. A requisition was provided today to obtain that lab work prior to the return visit. Those results will be reviewed when they are received and the results will be communicated to the patient.    Hypertension:  Her blood pressure is borderline elevated today.   I told her to monitor her blood pressures at home periodically and average systolic and diastolic blood pressures taken  morning and evening for a week. If the average of those pressures exceed 130/80 she should contact this office for further recommendations in regard to blood pressure management.    Iodinated contrast allergy:   She tolerated her angiograms with steroid pretreatment. This is noted in her medical record.    No changes were made in her regimen today.  Listed below you will find the regimen that she will be requested to continue:      Current Outpatient Medications:   •  ALPRAZolam (XANAX) 0.25 mg tablet, Take 0.25 mg by mouth daily as needed., Disp: , Rfl:   •  amLODIPine (NORVASC) 5 mg tablet, Take 5 mg by mouth daily., Disp: , Rfl:   •  aspirin 81 mg enteric coated tablet, Take 81 mg by mouth daily., Disp: , Rfl:   •  atorvastatin (LIPITOR) 80 mg tablet, Take 80 mg by mouth daily., Disp: , Rfl:   •  cholecalciferol, vitamin D3, (cholecalciferol) 1,000 unit tablet, Take 1,000 Units by mouth daily., Disp: , Rfl:   •  isosorbide mononitrate (IMDUR) 30 mg 24 hr tablet, Take 15 mg by mouth daily.  , Disp: , Rfl:   •  magnesium oxide 500 mg capsule, Take 250 mg by mouth daily., Disp: , Rfl:   •  nitroglycerin (NITROLINGUAL) 400 mcg/spray spray, Place 1 spray under the tongue every 5 (five) minutes as needed for chest pain., Disp: 12 g, Rfl: 0  •  omeprazole 20 mg tablet, Take 20 mg by mouth daily., Disp: , Rfl:   •  fluticasone-salmeterol (ADVAIR DISKUS) 250-50 mcg/dose diskus inhaler, Inhale 2 puffs 2 (two) times a day.  , Disp: , Rfl:   •  nitroglycerin (NITROSTAT) 0.4 mg SL tablet, Place 1 tablet (0.4 mg total) under the tongue as needed for chest pain. Place under the tongue every 5 minutes as needed for chest pain (Patient not taking: Reported on 1/27/2020 ), Disp: 25 tablet, Rfl: 1       If there are no new interval cardiovascular problems, I will see her again in 6 months.    I sincerely appreciate the opportunity to participate in the care of your patients. Please do not hesitate to contact me if any questions or  problems arise.     With best wishes and warmest personal regards.      Sincerely,        m  Rojas Gloria MD, [facility]    This document was generated utilizing voice recognition technology. A reasonable attempt at proofreading has been made to minimize errors but please excuse any typographical errors which may be present. Please call with any questions.

## 2020-09-02 LAB
ALBUMIN SERPL-MCNC: 4.4 G/DL (ref 3.6–5.1)
ALBUMIN SERPL-MCNC: 4.4 G/DL (ref 3.6–5.1)
ALBUMIN/GLOB SERPL: 2.2 (CALC) (ref 1–2.5)
ALBUMIN/GLOB SERPL: 2.2 (CALC) (ref 1–2.5)
ALP SERPL-CCNC: 44 U/L (ref 37–153)
ALP SERPL-CCNC: 44 U/L (ref 37–153)
ALT SERPL-CCNC: 15 U/L (ref 6–29)
ALT SERPL-CCNC: 16 U/L (ref 6–29)
AST SERPL-CCNC: 22 U/L (ref 10–35)
AST SERPL-CCNC: 22 U/L (ref 10–35)
BILIRUB DIRECT SERPL-MCNC: 0.1 MG/DL
BILIRUB INDIRECT SERPL-MCNC: 0.5 MG/DL (CALC) (ref 0.2–1.2)
BILIRUB SERPL-MCNC: 0.6 MG/DL (ref 0.2–1.2)
BILIRUB SERPL-MCNC: 0.6 MG/DL (ref 0.2–1.2)
BUN SERPL-MCNC: 8 MG/DL (ref 7–25)
BUN/CREAT SERPL: NORMAL (CALC) (ref 6–22)
CALCIUM SERPL-MCNC: 9.3 MG/DL (ref 8.6–10.4)
CHLORIDE SERPL-SCNC: 100 MMOL/L (ref 98–110)
CHOLEST SERPL-MCNC: 216 MG/DL
CHOLEST/HDLC SERPL: 2.2 (CALC)
CK SERPL-CCNC: 76 U/L (ref 29–143)
CO2 SERPL-SCNC: 29 MMOL/L (ref 20–32)
CREAT SERPL-MCNC: 0.64 MG/DL (ref 0.5–0.99)
GLOBULIN SER CALC-MCNC: 2 G/DL (CALC) (ref 1.9–3.7)
GLOBULIN SER CALC-MCNC: 2 G/DL (CALC) (ref 1.9–3.7)
GLUCOSE SERPL-MCNC: 86 MG/DL (ref 65–99)
HDLC SERPL-MCNC: 98 MG/DL
LDLC SERPL CALC-MCNC: 100 MG/DL (CALC)
NONHDLC SERPL-MCNC: 118 MG/DL (CALC)
POTASSIUM SERPL-SCNC: 4.5 MMOL/L (ref 3.5–5.3)
PROT SERPL-MCNC: 6.4 G/DL (ref 6.1–8.1)
PROT SERPL-MCNC: 6.4 G/DL (ref 6.1–8.1)
QUEST EGFR NON-AFR. AMERICAN: 94 ML/MIN/1.73M2
SODIUM SERPL-SCNC: 136 MMOL/L (ref 135–146)
TRIGL SERPL-MCNC: 90 MG/DL

## 2020-09-17 ENCOUNTER — TELEPHONE (OUTPATIENT)
Dept: CARDIOLOGY | Facility: CLINIC | Age: 65
End: 2020-09-17

## 2020-09-17 NOTE — TELEPHONE ENCOUNTER
*left vm to confirm 9.18.20 in office appt. asked to please call to confirm, verify demos and complete travel screening…ANNMARIE 9.17.20

## 2020-10-01 NOTE — PROGRESS NOTES
Rojas Gloria MD, [facility]  Director, Clinical Cardiology -  [facility]  and Main [facility]  The Carlos Perkins III Chair in Innovative Cardiology  Clinical Associate Professor of Medicine  The [facility] HEART [facility]  [facility]  Jaimeanine Level  [address] [place] [address]    TEL  [phone]  [email or web address]      2020      Jayson London MD   [facility] Medicine   [address] [facility] [place] [address]      Melo Burkett MD  Cardiology  [address] [facility] [place] [address] [phone]    Re: Melly Liu  : 1955      Dear Colleagues,      I saw Melly Couch in my office today on 2020.      She is a 65-year-old former smoker with coronary artery disease status post 2 small non-STEMI's (3/10 & 12/10) and episode of ST elevation associated coronary spasm with entirely normal coronary arteries and small troponin leak in ('15), hypercholesterolemia, and a history of an iodinated contrast allergy  who presents today for her 7-month follow-up visit.    She reports no chest pain at rest or with physical activities over the last 6 months.  She has not experienced any further coronary events over the last 5 1/2 years.    She continues to be troubled by back pain and recently returned to the swimming pool recently after it was reopened following closure due to the COVID-19 pandemic at the swimming for about 30 minutes 3 times.    On one occasion she experienced a sensation of transient fuzziness which had occurred prior to her previous non-STEMI events but this episode was not followed by any jaw or chest discomfort.    She has been taking her atorvastatin regularly without side effects such as myalgias or significant muscle cramping.    Her blood pressure has been increasing gradually prompting valentina Neil  "cardiologist that she sees in [place], to increase her amlodipine dose to 10 mg a day which she has been tolerating.    I have reviewed the problem list, allergies, medications and social history, and they are up to date in the electronic record as of the current encounter.     ROS: The remainder of the review of systems is negative in detail for significant complaints.    No changes in her medications have been made since the last visit.    Allergies   Allergen Reactions   • Iodine And Iodide Containing Products Shortness of breath and Angioedema     tolerates IV dye with steroid prep (IV Decadron)   • Shellfish Derived      Other reaction(s):  swelling, throat closing   • Ramipril      Other reaction(s): Hypotension       Physical Exam:  Visit Vitals  BP (!) 135/95   Pulse 100   Resp 16   Ht 1.676 m (5' 6\")   Wt 68.5 kg (151 lb)   BMI 24.37 kg/m²     General: well-developed, well-nourished, appears well, no acute distress  HEENT: sclera anicteric, conjunctiva pink, no xanthelasma, neck supple, no thyromegaly  Chest: clear to auscultation, symmetrical expansion, no scoliosis   Heart: Apical impulse normally situated , regular rhythm, normal S1, normal S2, no gallops, no murmur  JVP: normal jugular venous pressure, negative AJR  Vascular: carotid pulses: intact bilaterally, no bruit, peripheral pulses: intact bilaterally  Abd: soft, non-tender, no hepatosplenomegaly, abdominal aorta not palpable  Ext: no edema, no clubbing, no cyanosis  Skin: warm, dry, no ecchymoses  Neuro: alert, oriented x 3, normal muscle strength  Psychiatric: normal affect, normal judgment, normal insight and normal memory      LABS:     Ref. Range 9/1/2020 11:45 9/1/2020 11:48   Sodium Latest Ref Range: 135 - 146 mmol/L  136   Potassium Latest Ref Range: 3.5 - 5.3 mmol/L  4.5   Chloride Latest Ref Range: 98 - 110 mmol/L  100   CO2 Latest Ref Range: 20 - 32 mmol/L  29   BUN Latest Ref Range: 7 - 25 mg/dL  8   Creatinine Latest Ref Range: " 0.50 - 0.99 mg/dL  0.64   Glucose Latest Ref Range: 65 - 99 mg/dL  86   Calcium Latest Ref Range: 8.6 - 10.4 mg/dL  9.3   AST (SGOT) Latest Ref Range: 10 - 35 U/L 22 22   ALT (SGPT) Latest Ref Range: 6 - 29 U/L 15 16   Alkaline Phosphatase Latest Ref Range: 37 - 153 U/L 44 44   Total Protein Latest Ref Range: 6.1 - 8.1 g/dL 6.4 6.4   Albumin Latest Ref Range: 3.6 - 5.1 g/dL 4.4 4.4   Bilirubin, Total Latest Ref Range: 0.2 - 1.2 mg/dL 0.6 0.6   Bilirubin, Direct Latest Ref Range: < OR = 0.2 mg/dL 0.1    eGFR Latest Ref Range: > OR = 60 mL/min/1.73m2  94   Bun/Creatinine Ratio Latest Ref Range: 6 - 22 (calc)  NOT APPLICABLE   Globulin Latest Ref Range: 1.9 - 3.7 g/dL (calc) 2.0 2.0   Cholesterol Latest Ref Range: <200 mg/dL 216 (H)    Triglycerides Latest Ref Range: <150 mg/dL 90    HDL Latest Ref Range: > OR = 50 mg/dL 98    LDL Calculated Latest Units: mg/dL (calc) 100 (H)    Non-HDL, Calculated Latest Ref Range: <130 mg/dL (calc) 118    Chol/Hdlc Ratio Latest Ref Range: <5.0 (calc) 2.2    ALBUMIN/GLOBULIN RATIO Latest Ref Range: 1.0 - 2.5 (calc) 2.2 2.2   Bilirubin, Indirect Latest Ref Range: 0.2 - 1.2 mg/dL (calc) 0.5    Creatine Kinase, Total Latest Ref Range: 29 - 143 U/L  76       ECG:      Impression And Plan:  Coronary vasospasm-status post non-STEMI’s 3/10, 12/10, 6/15:   I suspect that all of her events were related to episodes of coronary vasospasm with normal coronary arteries. She has been free of recurrent events for the last the 5-1/2 years on her current regimen which will be continued including amlodipine and isosorbide mononitrate. She will also continue aspirin and lipid-lowering therapy.    Hypercholesterolemia:   Although her LDL is 100, her HDL is 98 and she had no evidence of any coronary atherosclerosis on her coronary angiograms.    Hypertension:  Her blood pressure remains elevated today.  I added 25 mg of losartan to her regimen.    Iodinated contrast allergy:   She tolerated her angiograms  with steroid pretreatment. This is noted in her medical record.    No changes were made in her regimen today.  Listed below you will find the regimen that she will be requested to continue:      Current Outpatient Medications:   •  albuterol HFA (VENTOLIN HFA) 90 mcg/actuation inhaler, Inhale 2 puffs every 6 (six) hours as needed for shortness of breath.  , Disp: , Rfl:   •  ALPRAZolam (XANAX) 0.25 mg tablet, Take 0.25 mg by mouth daily as needed., Disp: , Rfl:   •  amLODIPine (NORVASC) 5 mg tablet, Take 10 mg by mouth daily.  , Disp: , Rfl:   •  aspirin 81 mg enteric coated tablet, Take 81 mg by mouth daily., Disp: , Rfl:   •  atorvastatin (LIPITOR) 80 mg tablet, Take 80 mg by mouth daily., Disp: , Rfl:   •  cholecalciferol, vitamin D3, (cholecalciferol) 1,000 unit tablet, Take 1,000 Units by mouth daily., Disp: , Rfl:   •  fluticasone-salmeterol (ADVAIR DISKUS) 250-50 mcg/dose diskus inhaler, Inhale 2 puffs 2 (two) times a day.  , Disp: , Rfl:   •  isosorbide mononitrate (IMDUR) 30 mg 24 hr tablet, Take 15 mg by mouth daily.  , Disp: , Rfl:   •  lansoprazole (PREVACID) 30 mg capsule, Take 30 mg by mouth once daily., Disp: , Rfl:   •  magnesium oxide 500 mg capsule, Take 250 mg by mouth daily., Disp: , Rfl:   •  nitroglycerin (NITROLINGUAL) 400 mcg/spray spray, Place 1 spray under the tongue every 5 (five) minutes as needed for chest pain., Disp: , Rfl:   •  nitroglycerin (NITROSTAT) 0.4 mg SL tablet, Place 1 tablet (0.4 mg total) under the tongue as needed for chest pain. Place under the tongue every 5 minutes as needed for chest pain, Disp: 25 tablet, Rfl: 1  •  omeprazole 20 mg tablet, Take 20 mg by mouth daily., Disp: , Rfl:   •  vitamin B complex 100  2-herbs 100 mg tablet, Take 1 tablet by mouth once daily., Disp: , Rfl:        I will be retiring at the end of 2020. I have suggested that she continue her cardiologic care with Nirav Don MD, one of my colleagues in the [facility].  A follow-up  appointment will be arranged for her to see him in about 6 months.    I have sincerely appreciated the opportunity to participate in the care of your patients. Please do not hesitate to contact me if any questions or problems arise until my prison in mid December 2020.     With best wishes and warmest personal regards.      Sincerely,        m  Rojas Gloria MD, [facility]    This document was generated utilizing voice recognition technology. A reasonable attempt at proofreading has been made to minimize errors but please excuse any typographical errors which may be present. Please call with any questions.

## 2020-10-02 ENCOUNTER — OFFICE VISIT (OUTPATIENT)
Dept: CARDIOLOGY | Facility: CLINIC | Age: 65
End: 2020-10-02
Payer: COMMERCIAL

## 2020-10-02 VITALS
HEART RATE: 100 BPM | DIASTOLIC BLOOD PRESSURE: 95 MMHG | HEIGHT: 66 IN | BODY MASS INDEX: 24.27 KG/M2 | WEIGHT: 151 LBS | SYSTOLIC BLOOD PRESSURE: 135 MMHG | RESPIRATION RATE: 16 BRPM

## 2020-10-02 DIAGNOSIS — E78.00 HYPERCHOLESTEROLEMIA: Chronic | ICD-10-CM

## 2020-10-02 DIAGNOSIS — I25.2 OLD MYOCARDIAL INFARCTION: Chronic | ICD-10-CM

## 2020-10-02 DIAGNOSIS — I25.10 ATHEROSCLEROSIS OF NATIVE CORONARY ARTERY OF NATIVE HEART WITHOUT ANGINA PECTORIS: Primary | Chronic | ICD-10-CM

## 2020-10-02 DIAGNOSIS — Z91.041 ALLERGY TO INTRAVENOUS CONTRAST MEDIA: Chronic | ICD-10-CM

## 2020-10-02 DIAGNOSIS — Z87.891 FORMER TOBACCO USE: Chronic | ICD-10-CM

## 2020-10-02 DIAGNOSIS — J45.909 ASTHMA, UNSPECIFIED ASTHMA SEVERITY, UNSPECIFIED WHETHER COMPLICATED, UNSPECIFIED WHETHER PERSISTENT: Chronic | ICD-10-CM

## 2020-10-02 DIAGNOSIS — I21.3 ST ELEVATION MYOCARDIAL INFARCTION (STEMI), UNSPECIFIED ARTERY (CMS/HCC): Chronic | ICD-10-CM

## 2020-10-02 PROCEDURE — 99214 OFFICE O/P EST MOD 30 MIN: CPT | Performed by: INTERNAL MEDICINE

## 2020-10-02 PROCEDURE — 93000 ELECTROCARDIOGRAM COMPLETE: CPT | Performed by: INTERNAL MEDICINE

## 2020-10-02 RX ORDER — LOSARTAN POTASSIUM 25 MG/1
25 TABLET ORAL DAILY
Qty: 90 TABLET | Refills: 5 | Status: SHIPPED | OUTPATIENT
Start: 2020-10-02 | End: 2024-10-31

## 2020-10-02 RX ORDER — DULOXETIN HYDROCHLORIDE 30 MG/1
30 CAPSULE, DELAYED RELEASE ORAL DAILY
COMMUNITY
End: 2020-10-02 | Stop reason: ENTERED-IN-ERROR

## 2020-10-02 RX ORDER — NITROGLYCERIN 400 UG/1
1 SPRAY ORAL EVERY 5 MIN PRN
COMMUNITY
End: 2020-10-02 | Stop reason: SDUPTHER

## 2020-10-02 RX ORDER — LANSOPRAZOLE 30 MG/1
30 CAPSULE, DELAYED RELEASE ORAL DAILY
COMMUNITY
End: 2022-09-13

## 2020-10-02 RX ORDER — METOPROLOL SUCCINATE 25 MG/1
12.5 TABLET, EXTENDED RELEASE ORAL DAILY
COMMUNITY
End: 2020-10-02 | Stop reason: ENTERED-IN-ERROR

## 2020-10-02 RX ORDER — ALBUTEROL SULFATE 90 UG/1
2 INHALANT RESPIRATORY (INHALATION) EVERY 6 HOURS PRN
COMMUNITY
Start: 2020-07-16

## 2020-10-02 RX ORDER — NITROGLYCERIN 400 UG/1
1 SPRAY ORAL EVERY 5 MIN PRN
Qty: 12 G | Refills: 4 | Status: SHIPPED | OUTPATIENT
Start: 2020-10-02 | End: 2021-07-30 | Stop reason: SDUPTHER

## 2020-10-02 ASSESSMENT — ENCOUNTER SYMPTOMS
DIZZINESS: 0
PALPITATIONS: 0
BLOOD IN STOOL: 0
ABDOMINAL PAIN: 0
FATIGUE: 0
SPEECH DIFFICULTY: 0
FEVER: 0
ANAL BLEEDING: 0
NERVOUS/ANXIOUS: 1
FACIAL ASYMMETRY: 0
LIGHT-HEADEDNESS: 0
HEMATURIA: 0
MYALGIAS: 0
CHILLS: 0
BRUISES/BLEEDS EASILY: 1
WHEEZING: 0
APNEA: 0
COUGH: 0
SHORTNESS OF BREATH: 0

## 2020-10-02 NOTE — LETTER
Rojas Gloria MD, [facility]  Director, Clinical Cardiology -  [facility]  and Main [facility]  The Carlos Perkins III Chair in Innovative Cardiology  Clinical Associate Professor of Medicine  The [facility] HEART [facility]  [facility]  Jaimeanine Level  [address] [place] [address]    TEL  [phone]  [email or web address]      2020      Jayson London MD   [facility] Medicine   [address] [place] [address]      Melo Burkett MD  Cardiology  [address] [place] [address] [phone]    Re: Melly Liu  : 1955      Dear Colleagues,      I saw Melly Couch in my office today on 2020.      She is a 65-year-old former smoker with coronary artery disease status post 2 small non-STEMI's (3/10 & 12/10) and episode of ST elevation associated coronary spasm with entirely normal coronary arteries and small troponin leak in ('15), hypercholesterolemia, and a history of an iodinated contrast allergy  who presents today for her 7-month follow-up visit.    She reports no chest pain at rest or with physical activities over the last 6 months.  She has not experienced any further coronary events over the last 5 1/2 years.    She continues to be troubled by back pain and recently returned to the swimming pool recently after it was reopened following closure due to the COVID-19 pandemic at the swimming for about 30 minutes 3 times.    On one occasion she experienced a sensation of transient fuzziness which had occurred prior to her previous non-STEMI events but this episode was not followed by any jaw or chest discomfort.    She has been taking her atorvastatin regularly without side effects such as myalgias or significant muscle cramping.    Her blood pressure has been increasing gradually prompting valentina Neil  "cardiologist that she sees in [place], to increase her amlodipine dose to 10 mg a day which she has been tolerating.    I have reviewed the problem list, allergies, medications and social history, and they are up to date in the electronic record as of the current encounter.     ROS: The remainder of the review of systems is negative in detail for significant complaints.    No changes in her medications have been made since the last visit.    Allergies   Allergen Reactions   • Iodine And Iodide Containing Products Shortness of breath and Angioedema     tolerates IV dye with steroid prep (IV Decadron)   • Shellfish Derived      Other reaction(s):  swelling, throat closing   • Ramipril      Other reaction(s): Hypotension       Physical Exam:  Visit Vitals  BP (!) 135/95   Pulse 100   Resp 16   Ht 1.676 m (5' 6\")   Wt 68.5 kg (151 lb)   BMI 24.37 kg/m²     General: well-developed, well-nourished, appears well, no acute distress  HEENT: sclera anicteric, conjunctiva pink, no xanthelasma, neck supple, no thyromegaly  Chest: clear to auscultation, symmetrical expansion, no scoliosis   Heart: Apical impulse normally situated , regular rhythm, normal S1, normal S2, no gallops, no murmur  JVP: normal jugular venous pressure, negative AJR  Vascular: carotid pulses: intact bilaterally, no bruit, peripheral pulses: intact bilaterally  Abd: soft, non-tender, no hepatosplenomegaly, abdominal aorta not palpable  Ext: no edema, no clubbing, no cyanosis  Skin: warm, dry, no ecchymoses  Neuro: alert, oriented x 3, normal muscle strength  Psychiatric: normal affect, normal judgment, normal insight and normal memory      LABS:     Ref. Range 9/1/2020 11:45 9/1/2020 11:48   Sodium Latest Ref Range: 135 - 146 mmol/L  136   Potassium Latest Ref Range: 3.5 - 5.3 mmol/L  4.5   Chloride Latest Ref Range: 98 - 110 mmol/L  100   CO2 Latest Ref Range: 20 - 32 mmol/L  29   BUN Latest Ref Range: 7 - 25 mg/dL  8   Creatinine Latest Ref Range: " 0.50 - 0.99 mg/dL  0.64   Glucose Latest Ref Range: 65 - 99 mg/dL  86   Calcium Latest Ref Range: 8.6 - 10.4 mg/dL  9.3   AST (SGOT) Latest Ref Range: 10 - 35 U/L 22 22   ALT (SGPT) Latest Ref Range: 6 - 29 U/L 15 16   Alkaline Phosphatase Latest Ref Range: 37 - 153 U/L 44 44   Total Protein Latest Ref Range: 6.1 - 8.1 g/dL 6.4 6.4   Albumin Latest Ref Range: 3.6 - 5.1 g/dL 4.4 4.4   Bilirubin, Total Latest Ref Range: 0.2 - 1.2 mg/dL 0.6 0.6   Bilirubin, Direct Latest Ref Range: < OR = 0.2 mg/dL 0.1    eGFR Latest Ref Range: > OR = 60 mL/min/1.73m2  94   Bun/Creatinine Ratio Latest Ref Range: 6 - 22 (calc)  NOT APPLICABLE   Globulin Latest Ref Range: 1.9 - 3.7 g/dL (calc) 2.0 2.0   Cholesterol Latest Ref Range: <200 mg/dL 216 (H)    Triglycerides Latest Ref Range: <150 mg/dL 90    HDL Latest Ref Range: > OR = 50 mg/dL 98    LDL Calculated Latest Units: mg/dL (calc) 100 (H)    Non-HDL, Calculated Latest Ref Range: <130 mg/dL (calc) 118    Chol/Hdlc Ratio Latest Ref Range: <5.0 (calc) 2.2    ALBUMIN/GLOBULIN RATIO Latest Ref Range: 1.0 - 2.5 (calc) 2.2 2.2   Bilirubin, Indirect Latest Ref Range: 0.2 - 1.2 mg/dL (calc) 0.5    Creatine Kinase, Total Latest Ref Range: 29 - 143 U/L  76       ECG:      Impression And Plan:  Coronary vasospasm-status post non-STEMI’s 3/10, 12/10, 6/15:   I suspect that all of her events were related to episodes of coronary vasospasm with normal coronary arteries. She has been free of recurrent events for the last the 5-1/2 years on her current regimen which will be continued including amlodipine and isosorbide mononitrate. She will also continue aspirin and lipid-lowering therapy.    Hypercholesterolemia:   Although her LDL is 100, her HDL is 98 and she had no evidence of any coronary atherosclerosis on her coronary angiograms.    Hypertension:  Her blood pressure remains elevated today.  I added 25 mg of losartan to her regimen.    Iodinated contrast allergy:   She tolerated her angiograms  with steroid pretreatment. This is noted in her medical record.    No changes were made in her regimen today.  Listed below you will find the regimen that she will be requested to continue:      Current Outpatient Medications:   •  albuterol HFA (VENTOLIN HFA) 90 mcg/actuation inhaler, Inhale 2 puffs every 6 (six) hours as needed for shortness of breath.  , Disp: , Rfl:   •  ALPRAZolam (XANAX) 0.25 mg tablet, Take 0.25 mg by mouth daily as needed., Disp: , Rfl:   •  amLODIPine (NORVASC) 5 mg tablet, Take 10 mg by mouth daily.  , Disp: , Rfl:   •  aspirin 81 mg enteric coated tablet, Take 81 mg by mouth daily., Disp: , Rfl:   •  atorvastatin (LIPITOR) 80 mg tablet, Take 80 mg by mouth daily., Disp: , Rfl:   •  cholecalciferol, vitamin D3, (cholecalciferol) 1,000 unit tablet, Take 1,000 Units by mouth daily., Disp: , Rfl:   •  fluticasone-salmeterol (ADVAIR DISKUS) 250-50 mcg/dose diskus inhaler, Inhale 2 puffs 2 (two) times a day.  , Disp: , Rfl:   •  isosorbide mononitrate (IMDUR) 30 mg 24 hr tablet, Take 15 mg by mouth daily.  , Disp: , Rfl:   •  lansoprazole (PREVACID) 30 mg capsule, Take 30 mg by mouth once daily., Disp: , Rfl:   •  magnesium oxide 500 mg capsule, Take 250 mg by mouth daily., Disp: , Rfl:   •  nitroglycerin (NITROLINGUAL) 400 mcg/spray spray, Place 1 spray under the tongue every 5 (five) minutes as needed for chest pain., Disp: , Rfl:   •  nitroglycerin (NITROSTAT) 0.4 mg SL tablet, Place 1 tablet (0.4 mg total) under the tongue as needed for chest pain. Place under the tongue every 5 minutes as needed for chest pain, Disp: 25 tablet, Rfl: 1  •  omeprazole 20 mg tablet, Take 20 mg by mouth daily., Disp: , Rfl:   •  vitamin B complex 100  2-herbs 100 mg tablet, Take 1 tablet by mouth once daily., Disp: , Rfl:        I will be retiring at the end of 2020. I have suggested that she continue her cardiologic care with Nirav Don MD, one of my colleagues in the [facility].  A follow-up  appointment will be arranged for her to see him in about 6 months.    I have sincerely appreciated the opportunity to participate in the care of your patients. Please do not hesitate to contact me if any questions or problems arise until my [facility] in mid December 2020.     With best wishes and warmest personal regards.      Sincerely,        m  Rojas Gloria MD, [facility]    This document was generated utilizing voice recognition technology. A reasonable attempt at proofreading has been made to minimize errors but please excuse any typographical errors which may be present. Please call with any questions.

## 2020-10-02 NOTE — PROGRESS NOTES
"      Subjective      Patient ID: Melly Couch is a 65 y.o. female.  1955      Her exercise had consisted of swimming for 30 laps nightly until the gym opened. She is now swimming for 30 minutes 3 times a week.      The following have been reviewed and updated as appropriate in this visit:  Tobacco  Allergies  Meds  Problems  Med Hx  Surg Hx  Fam Hx  Soc Hx        Review of Systems   Constitutional: Negative for chills, fatigue and fever.        Weight decreased 3 lbs since her last visit   HENT: Positive for nosebleeds (severe nosebleed in February - ER visit for).    Respiratory: Negative for apnea, cough, shortness of breath and wheezing.         No snoring   Cardiovascular: Negative for chest pain, palpitations and leg swelling.        No orthopnea or PND   Gastrointestinal: Negative for abdominal pain, anal bleeding and blood in stool.   Genitourinary: Negative for hematuria.   Musculoskeletal: Negative for myalgias.   Neurological: Negative for dizziness, syncope, facial asymmetry, speech difficulty and light-headedness.   Hematological: Bruises/bleeds easily (bruising).   Psychiatric/Behavioral: The patient is nervous/anxious (anxietu=shelby witthmental confusion but no chest pain - HR was 120 bpm - saw local cardiologist and ECG OK (previos events have occurred with these sxs initially).        Objective     Vitals:    10/02/20 1048   Pulse: 100   Resp: 16   Weight: 68.5 kg (151 lb)   Height: 1.676 m (5' 6\")     Body mass index is 24.37 kg/m².    Physical Exam    Assessment/Plan   Diagnoses and all orders for this visit:    Atherosclerosis of native coronary artery of native heart without angina pectoris (Primary)    Old myocardial infarction    ST elevation myocardial infarction (STEMI), unspecified artery (CMS/HCC)  -     ECG 12 LEAD-OFFICE PERFORMED    Hypercholesterolemia    Former tobacco use    Allergy to intravenous contrast media    Asthma, unspecified asthma severity, unspecified " whether complicated, unspecified whether persistent

## 2021-01-28 DIAGNOSIS — Z23 ENCOUNTER FOR IMMUNIZATION: ICD-10-CM

## 2021-02-08 ENCOUNTER — IMMUNIZATIONS (OUTPATIENT)
Dept: FAMILY MEDICINE CLINIC | Facility: HOSPITAL | Age: 66
End: 2021-02-08

## 2021-02-08 DIAGNOSIS — Z23 ENCOUNTER FOR IMMUNIZATION: Primary | ICD-10-CM

## 2021-02-08 PROCEDURE — 0011A SARS-COV-2 / COVID-19 MRNA VACCINE (MODERNA) 100 MCG: CPT

## 2021-02-08 PROCEDURE — 91301 SARS-COV-2 / COVID-19 MRNA VACCINE (MODERNA) 100 MCG: CPT

## 2021-03-08 ENCOUNTER — IMMUNIZATIONS (OUTPATIENT)
Dept: FAMILY MEDICINE CLINIC | Facility: HOSPITAL | Age: 66
End: 2021-03-08

## 2021-03-08 DIAGNOSIS — Z23 ENCOUNTER FOR IMMUNIZATION: Primary | ICD-10-CM

## 2021-03-08 PROCEDURE — 0012A SARS-COV-2 / COVID-19 MRNA VACCINE (MODERNA) 100 MCG: CPT

## 2021-03-08 PROCEDURE — 91301 SARS-COV-2 / COVID-19 MRNA VACCINE (MODERNA) 100 MCG: CPT

## 2021-04-01 ENCOUNTER — TELEPHONE (OUTPATIENT)
Dept: SCHEDULING | Facility: CLINIC | Age: 66
End: 2021-04-01

## 2021-04-01 NOTE — TELEPHONE ENCOUNTER
Please switch labs from 7/27 to [employer] order form. Pat is calling back with fax number to lab. Please fax to lab when she calls back with number. ty

## 2021-04-01 NOTE — TELEPHONE ENCOUNTER
Pt called to Cancel NP Appt for Tomorrow 4/2/2021 with Dr. Don. Previous patient of Dr. Gloria.      Pt states something came up at work.     Pt is Re-schedule for Next available appt for 5/25/2021 @ 2:40pm. Pt comfortable with date.    TY

## 2021-04-01 NOTE — TELEPHONE ENCOUNTER
Pt returned call with Fax number for [facility] to send lab slips to:    Fax- [phone]    Pt states appt is for today at 2pm    Pt can be reached at [phone].

## 2021-07-27 NOTE — PROGRESS NOTES
" Nirav Don MD  Cardiology    [facility]    [facility]  [facility] [address] [facility]  [address] [place] [address]    TEL  [phone]  [facility]     07/30/21     Dear Dr. London:    It was my pleasure to see Ms. Melly Couch at the [facility] today.  She is presenting for cardiovascular follow-up. She was previously cared for by my colleague, Dr. Gloria, who retired at the end of last year.  She continues to follow with her primary cardiologist, Dr. Burkett, in [place] as well.    As you know, she is a 66-year-old former smoker with coronary artery disease status post 2 small non-STEMI's (3/10 & 12/10) and episode of ST elevation associated coronary spasm with entirely normal coronary arteries and small troponin leak in ('15), hypercholesterolemia, and a history of an iodinated contrast allergy.    She has done quite well overall since her last visit.  No chest pain or dyspnea at rest or with physical activities.  She has very rare \"mental confusion\" that improves with Xanax.  In the past, the symptoms were the precursor to chest symptoms.  She saw Dr. Burkett following these episodes and her evaluation was unremarkable.  She has been dealing with back pain that has limited her exercise.  She continues to swim for at least 20 minutes several days per week.  No cardiopulmonary symptoms with activity.  Her diet is quite healthy.  She has gained 5 pounds due to her decreased exercise.  Home blood pressures typically 130/80.  She is compliant with her medications and reports no side effects.  No bleeding issues.    Past Medical History:  Past Medical History:   Diagnosis Date   • Allergy to intravenous contrast media 1/28/2014    Overview:    • Asthma 1/27/2014    Overview:    • Coronary atherosclerosis of native coronary artery 1/27/2014    Overview:    • GERD (gastroesophageal reflux disease) 1/27/2014    Overview:    • " Hypercholesterolemia 1/27/2014    Overview:    • Hypertension 1/27/2014    Overview:    • Old myocardial infarction 1/27/2014    Overview: small non-STEMI X 2 and STEMI associated with spasm X 1   • ST elevation myocardial infarction (STEMI) (CMS/[facility]ly [facility]) 6/30/2015    Overview:        Past Surgical History:  Past Surgical History:   Procedure Laterality Date   • BACK SURGERY  06/25/2015    Lumbar Foraminotomy Ls-S1   • CARDIAC CATHETERIZATION  03/2010    non-STEMI   • CARDIAC CATHETERIZATION  12/2010    small non-STEMI   • CARDIAC CATHETERIZATION  06/09/2015    STAMI associated with spasm and normal coronaries   • LUMBAR FUSION  03/13/2019    L4-L5 & L5-S1   • LUMBAR LAMINECTOMY  03/13/2019    L3-L4 with spinal fusion   • LUMBAR SPINE SURGERY  06/25/2015    Hemiectomy & Discectomy   • SINUS SURGERY  1991       Medications:  Current Outpatient Medications   Medication Sig Dispense Refill   • albuterol HFA (VENTOLIN HFA) 90 mcg/actuation inhaler Inhale 2 puffs every 6 (six) hours as needed for shortness of breath.       • ALPRAZolam (XANAX) 0.25 mg tablet Take 0.25 mg by mouth daily as needed.     • amLODIPine (NORVASC) 5 mg tablet Take 10 mg by mouth daily.       • aspirin 81 mg enteric coated tablet Take 81 mg by mouth daily.     • atorvastatin (LIPITOR) 80 mg tablet Take 80 mg by mouth daily.     • calcium carbonate-mag hydroxid 1,000-200 mg tablet,chewable Take by mouth.     • cholecalciferol, vitamin D3, (cholecalciferol) 1,000 unit tablet Take 1,000 Units by mouth daily.     • fluticasone-salmeterol (ADVAIR DISKUS) 250-50 mcg/dose diskus inhaler Inhale 2 puffs 2 (two) times a day.       • isosorbide mononitrate (IMDUR) 30 mg 24 hr tablet Take 15 mg by mouth daily.       • lansoprazole (PREVACID) 30 mg capsule Take 30 mg by mouth once daily.     • losartan (COZAAR) 25 mg tablet Take 1 tablet (25 mg total) by mouth daily. 90 tablet 5   • magnesium oxide 500 mg capsule Take 250 mg by mouth daily.     • nitroglycerin  (NITROLINGUAL) 400 mcg/spray spray Place 1 spray under the tongue every 5 (five) minutes as needed for chest pain. 12 g 4   • omeprazole 20 mg tablet Take 20 mg by mouth daily.     • vitamin B complex 100  2-herbs 100 mg tablet Take 1 tablet by mouth once daily.       No current facility-administered medications for this visit.       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  Now works for [employer].  She lives with her .  They have 2 children.  She is a former smoker, quitting in 1996.  She drinks approximately 2 glasses of wine 5 days/week.  No recreational drugs.    Family History: Brother with an MI at 44.  Sister with a PCI at 70.  Father with heart failure in his 70s.  Maternal grandfather with an MI at 55.      Review of Systems: A complete 14-point review of systems is negative, except as noted in the HPI.    Exam:  Objective   Vitals:    07/30/21 0921   BP: 128/72   Pulse: 96   Resp: 18   SpO2: 98%     Body mass index is 24.13 kg/m².  Constitutional: Appears comfortable.   Eyes: No icterus.   ENT: Deferred. Patient wearing a mask.   Neck: No jugular venous distention. Supple, normal range of motion.  Vascular: No carotid bruits. Radial pulses intact and equal.  Cardiac: Normal S1 and S2, regular rhythm. No murmurs, rubs, or gallops appreciated.  Lungs: Clear to auscultation bilaterally.  No wheezing.  GI: Soft, nontender, normoactive bowel sounds.  Extremities: Warm. No lower extremity edema.   Skin: Dry. No jaundice.   Musculoskeletal: No joint swelling or erythema.   Neurologic: Awake, alert, oriented.  Moving all extremities.  Psychiatric: No agitation.    Labs: Personally reviewed and discussed with the patient.  Notable for the following.   Lab Results   Component Value Date    LDLCALC 100 (H) 09/01/2020    NONHDLCHOL 118 09/01/2020    CHOL 216 (H) 09/01/2020    TRIG 90  09/01/2020    HDL 98 09/01/2020    CHOLHDLRAT 2.2 09/01/2020    HGBA1C 5.3 09/14/2015     09/01/2020    K 4.5 09/01/2020    BUN 8 09/01/2020    CREATININE 0.64 09/01/2020    WBC 6.0 02/18/2019    HGB 15.3 02/18/2019     02/18/2019 4/2021:  , TG 70, , LDL 69  LFTs within normal limits    Cardiovascular Studies:   1. Coronary angiogram, 3/2010: High-grade stenosis of the fifth right PLB  2. Coronary angiogram, 12/2010: Diffuse, nonobstructive disease.  Small caliber distal vessels.  LV gram LVEF 45-50% with apical akinesis.  3. Exercise nuclear stress, 12/2013: Small, fixed anterior defect could be artifact.  No ischemia.  Good exercise tolerance.  4. Coronary angiogram, 6/2015: Normal coronary arteries, normal LV systolic function    ECG from today personally reviewed and discussed with the patient shows sinus rhythm with low voltage.  No significant change compared with tracing from 10/2/2020.    Assessment/Plan   Coronary vasospasm-status post non-STEMI’s 3/10, 12/10, 6/15:   It is suspected that all of her events were related to episodes of coronary vasospasm with normal coronary arteries. She has been free of recurrent events for the last several years on her current regimen which will be continued including amlodipine and isosorbide mononitrate. She will also continue aspirin and lipid-lowering therapy.     Hypercholesterolemia:   Her LDL has improved with consistent administration of atorvastatin.  I will make no changes to her lipid-lowering regimen.  Given her family history, I will check a lipoprotein(a) with her next set of labs.  She will work to increase her level of activity, as able.  She will work to decrease her alcohol intake.     Hypertension:  Blood pressure reasonably controlled today.  She will continue to monitor her blood pressure intermittently at home, contacting us for readings consistently greater than 130/80.  I will continue her current regimen.     Family  history of premature coronary artery disease:  Aggressive risk factor modification will be continued.  I will check a lipoprotein(a) with her next set of labs.      It was my pleasure to visit with Melly Couch in clinic today.  She will follow up with me in 6 months.  Please do not hesitate to contact me with any questions.      Sincerely,         ________________  Nirav Don MD

## 2021-07-30 ENCOUNTER — OFFICE VISIT (OUTPATIENT)
Dept: CARDIOLOGY | Facility: CLINIC | Age: 66
End: 2021-07-30
Payer: COMMERCIAL

## 2021-07-30 VITALS
WEIGHT: 149.5 LBS | OXYGEN SATURATION: 98 % | DIASTOLIC BLOOD PRESSURE: 72 MMHG | HEART RATE: 96 BPM | RESPIRATION RATE: 18 BRPM | HEIGHT: 66 IN | BODY MASS INDEX: 24.03 KG/M2 | SYSTOLIC BLOOD PRESSURE: 128 MMHG

## 2021-07-30 DIAGNOSIS — I10 ESSENTIAL HYPERTENSION: ICD-10-CM

## 2021-07-30 DIAGNOSIS — I20.1 CORONARY VASOSPASM (CMS/HCC): Primary | ICD-10-CM

## 2021-07-30 DIAGNOSIS — E78.00 HYPERCHOLESTEROLEMIA: ICD-10-CM

## 2021-07-30 DIAGNOSIS — Z82.49 FAMILY HISTORY OF ISCHEMIC HEART DISEASE: ICD-10-CM

## 2021-07-30 PROCEDURE — 99214 OFFICE O/P EST MOD 30 MIN: CPT | Performed by: INTERNAL MEDICINE

## 2021-07-30 PROCEDURE — 93000 ELECTROCARDIOGRAM COMPLETE: CPT | Performed by: INTERNAL MEDICINE

## 2021-07-30 PROCEDURE — 3008F BODY MASS INDEX DOCD: CPT | Performed by: INTERNAL MEDICINE

## 2021-07-30 RX ORDER — NITROGLYCERIN 400 UG/1
1 SPRAY ORAL EVERY 5 MIN PRN
Qty: 12 G | Refills: 4 | Status: SHIPPED | OUTPATIENT
Start: 2021-07-30 | End: 2023-01-11

## 2021-09-05 LAB — LPA SERPL-SCNC: 212 NMOL/L

## 2021-09-06 ENCOUNTER — TELEPHONE (OUTPATIENT)
Dept: CARDIOLOGY | Facility: CLINIC | Age: 66
End: 2021-09-06

## 2021-09-06 DIAGNOSIS — E78.00 HYPERCHOLESTEROLEMIA: Primary | ICD-10-CM

## 2021-09-06 NOTE — TELEPHONE ENCOUNTER
Please call patient re: elevated lipoprotein(a). This is an independent risk factor for cardiovascular disease. This may help explain some of her strong family history of CAD. We do not yet have targeted treatment for this lipid particle, but lowering LDL is important. Recommend renewed efforts at routine exercise, continue with healthy diet. I would like her to repeat a lipid panel before follow up visit in February (ordered). We can discuss any changes to medications based on the results. If she has any other questions happy to speak with her. Thanks.

## 2021-09-07 NOTE — TELEPHONE ENCOUNTER
Concepcion Jackson is away on vacation.  I know  you left a message for patient.  She still has some questions

## 2022-02-03 ENCOUNTER — TELEPHONE (OUTPATIENT)
Dept: CARDIOLOGY | Facility: CLINIC | Age: 67
End: 2022-02-03
Payer: COMMERCIAL

## 2022-02-03 LAB
CHOLEST SERPL-MCNC: 203 MG/DL
CHOLEST/HDLC SERPL: 1.9 (CALC)
HDLC SERPL-MCNC: 108 MG/DL
LDLC SERPL CALC-MCNC: 77 MG/DL (CALC)
NONHDLC SERPL-MCNC: 95 MG/DL (CALC)
TRIGL SERPL-MCNC: 94 MG/DL

## 2022-02-03 NOTE — TELEPHONE ENCOUNTER
"I called pt and notified her the above lab result. She said that she also saw the result on \"Patient portal\" and she appreciate for the call. She will keep her appointment as scheduled on March 11.   "

## 2022-02-03 NOTE — TELEPHONE ENCOUNTER
Please let her know that I reviewed her cholesterol panel.  All of her numbers have improved.  I look forward to speaking with her more about this at her upcoming appointment.  Happy to speak with her sooner if she has questions.  Thanks.

## 2022-03-11 ENCOUNTER — OFFICE VISIT (OUTPATIENT)
Dept: CARDIOLOGY | Facility: CLINIC | Age: 67
End: 2022-03-11
Payer: COMMERCIAL

## 2022-03-11 VITALS
DIASTOLIC BLOOD PRESSURE: 72 MMHG | SYSTOLIC BLOOD PRESSURE: 120 MMHG | OXYGEN SATURATION: 98 % | HEIGHT: 66 IN | WEIGHT: 156.13 LBS | HEART RATE: 91 BPM | BODY MASS INDEX: 25.09 KG/M2 | RESPIRATION RATE: 18 BRPM

## 2022-03-11 DIAGNOSIS — E78.41 ELEVATED LIPOPROTEIN(A): ICD-10-CM

## 2022-03-11 DIAGNOSIS — I10 PRIMARY HYPERTENSION: ICD-10-CM

## 2022-03-11 DIAGNOSIS — I20.1 CORONARY VASOSPASM (CMS/HCC): Primary | ICD-10-CM

## 2022-03-11 DIAGNOSIS — E78.00 HYPERCHOLESTEROLEMIA: ICD-10-CM

## 2022-03-11 DIAGNOSIS — I25.10 ATHEROSCLEROSIS OF NATIVE CORONARY ARTERY OF NATIVE HEART WITHOUT ANGINA PECTORIS: ICD-10-CM

## 2022-03-11 DIAGNOSIS — Z82.49 FAMILY HISTORY OF ISCHEMIC HEART DISEASE: ICD-10-CM

## 2022-03-11 PROCEDURE — 3008F BODY MASS INDEX DOCD: CPT | Performed by: INTERNAL MEDICINE

## 2022-03-11 PROCEDURE — 99212 OFFICE O/P EST SF 10 MIN: CPT | Performed by: INTERNAL MEDICINE

## 2022-03-11 PROCEDURE — 93000 ELECTROCARDIOGRAM COMPLETE: CPT | Performed by: INTERNAL MEDICINE

## 2022-03-11 RX ORDER — EZETIMIBE 10 MG/1
10 TABLET ORAL NIGHTLY
Qty: 30 TABLET | Refills: 6 | Status: SHIPPED | OUTPATIENT
Start: 2022-03-11 | End: 2022-10-17

## 2022-03-11 NOTE — PROGRESS NOTES
Nirav Don MD  Cardiology    [facility] HEART GROUP    [facility]  [facility] [address] [facility]  [address] [place] [address]    TEL  [phone]  [facility]     03/11/22     Dear Dr. London:    It was my pleasure to see Ms. Melly Couch at the [facility] today for follow-up.  She continues to follow with her primary cardiologist, Dr. Burkett, in [place] as well.    As you know, she is a 66-year-old former smoker with coronary artery disease status post 2 small non-STEMI's (3/10 & 12/10) and episode of ST elevation associated coronary spasm with entirely normal coronary arteries and small troponin leak in ('15), hypercholesterolemia, elevated lipoprotein (a), and a history of an iodinated contrast allergy.    She has no new cardiopulmonary concerns or complaints today.  Due to orthopedic issues, she has not been exercise as she would like.  Her diet remains very good overall.  She is compliant with her medications and reports no side effects.  She has back surgery upcoming at the [facility].  She will be cleared for surgery by their cardiologist.    Past Medical History:  Past Medical History:   Diagnosis Date   • Allergy to intravenous contrast media 1/28/2014    Overview:    • Asthma 1/27/2014    Overview:    • Coronary atherosclerosis of native coronary artery 1/27/2014    Overview:    • GERD (gastroesophageal reflux disease) 1/27/2014    Overview:    • Hypercholesterolemia 1/27/2014    Overview:    • Hypertension 1/27/2014    Overview:    • Old myocardial infarction 1/27/2014    Overview: small non-STEMI X 2 and STEMI associated with spasm X 1   • ST elevation myocardial infarction (STEMI) (CMS/HCC) 6/30/2015    Overview:        Past Surgical History:  Past Surgical History:   Procedure Laterality Date   • BACK SURGERY  06/25/2015    Lumbar Foraminotomy Ls-S1   • CARDIAC CATHETERIZATION  03/2010    non-STEMI   • CARDIAC  CATHETERIZATION  12/2010    small non-STEMI   • CARDIAC CATHETERIZATION  06/09/2015    STAMI associated with spasm and normal coronaries   • LUMBAR FUSION  03/13/2019    L4-L5 & L5-S1   • LUMBAR LAMINECTOMY  03/13/2019    L3-L4 with spinal fusion   • LUMBAR SPINE SURGERY  06/25/2015    Hemiectomy & Discectomy   • SINUS SURGERY  1991       Medications:  Current Outpatient Medications   Medication Sig Dispense Refill   • albuterol HFA (VENTOLIN HFA) 90 mcg/actuation inhaler Inhale 2 puffs every 6 (six) hours as needed for shortness of breath.       • ALPRAZolam (XANAX) 0.25 mg tablet Take 0.25 mg by mouth daily as needed.     • amLODIPine (NORVASC) 5 mg tablet Take 10 mg by mouth daily.       • aspirin 81 mg enteric coated tablet Take 81 mg by mouth daily.     • atorvastatin (LIPITOR) 80 mg tablet Take 80 mg by mouth daily.     • calcium carbonate-mag hydroxid 1,000-200 mg tablet,chewable Take by mouth.     • cholecalciferol, vitamin D3, (cholecalciferol) 1,000 unit tablet Take 1,000 Units by mouth daily.     • fluticasone-salmeterol (ADVAIR DISKUS) 250-50 mcg/dose diskus inhaler Inhale 2 puffs 2 (two) times a day.       • isosorbide mononitrate (IMDUR) 30 mg 24 hr tablet Take 30 mg by mouth daily.       • lansoprazole (PREVACID) 30 mg capsule Take 30 mg by mouth once daily.     • losartan (COZAAR) 25 mg tablet Take 1 tablet (25 mg total) by mouth daily. 90 tablet 5   • magnesium oxide 500 mg capsule Take 250 mg by mouth daily.     • nitroglycerin (NITROLINGUAL) 400 mcg/spray spray Place 1 spray under the tongue every 5 (five) minutes as needed for chest pain. 12 g 4   • omeprazole 20 mg tablet Take 20 mg by mouth daily.     • vitamin B complex 100  2-herbs 100 mg tablet Take 1 tablet by mouth once daily.     • ezetimibe (ZETIA) 10 mg tablet Take 1 tablet (10 mg total) by mouth nightly. 30 tablet 6     No current facility-administered medications for this visit.       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  She lives with her .  They have 2 children.  She is a former smoker, quitting in 1996.  She drinks approximately 2 glasses of wine 5 days/week.  No recreational drugs.    Family History: Brother with an MI at 44.  Sister with a PCI at 70.  Father with heart failure in his 70s.  Maternal grandfather with an MI at 55.      Review of Systems: A complete 14-point review of systems is negative, except as noted in the HPI.    Exam:  Objective   Vitals:    03/11/22 1050   BP: 120/72   Pulse: 91   Resp: 18   SpO2: 98%     Body mass index is 25.2 kg/m².  Constitutional: Appears comfortable.   Eyes: No icterus.   ENT: Deferred. Patient wearing a mask.   Neck: No jugular venous distention. Supple, normal range of motion.  Vascular: No carotid bruits. Radial pulses intact and equal.  Cardiac: Normal S1 and S2, regular rhythm. No murmurs, rubs, or gallops appreciated.  Lungs: Clear to auscultation bilaterally.  No wheezing.  GI: Soft, nontender, normoactive bowel sounds.  Extremities: Warm. No lower extremity edema.   Skin: Dry. No jaundice.   Musculoskeletal: No joint swelling or erythema.   Neurologic: Awake, alert, oriented.  Moving all extremities.  Psychiatric: No agitation.    Labs: Personally reviewed and discussed with the patient.  Notable for the following.   Lab Results   Component Value Date    LDLCALC 77 02/02/2022    NONHDLCHOL 95 02/02/2022    CHOL 203 (H) 02/02/2022    TRIG 94 02/02/2022     02/02/2022    CHOLHDLRAT 1.9 02/02/2022    HGBA1C 5.3 09/14/2015     09/01/2020    K 4.5 09/01/2020    BUN 8 09/01/2020    CREATININE 0.64 09/01/2020    WBC 6.0 02/18/2019    HGB 15.3 02/18/2019     02/18/2019   Lp(a) 212 nmol/L    Cardiovascular Studies:   1. Coronary angiogram, 3/2010: High-grade stenosis of the fifth right PLB  2. Coronary angiogram,  12/2010: Diffuse, nonobstructive disease.  Small caliber distal vessels.  LV gram LVEF 45-50% with apical akinesis.  3. Exercise nuclear stress, 12/2013: Small, fixed anterior defect could be artifact.  No ischemia.  Good exercise tolerance.  4. Coronary angiogram, 6/2015: Normal coronary arteries, normal LV systolic function    ECG from today personally reviewed and discussed with the patient shows sinus rhythm with low voltage.  No significant change compared with tracing from 7/30/2021.    Assessment/Plan   Coronary vasospasm-status post non-STEMI’s 3/10, 12/10, 6/15:   It is suspected that all of her events were related to episodes of coronary vasospasm with normal coronary arteries. She has been free of recurrent events for the last several years on her current regimen which will be continued including amlodipine and isosorbide mononitrate. She will also continue aspirin and lipid-lowering therapy.     Hypercholesterolemia/elevated lipoprotein(a):   Her LDL is reasonably controlled, though given her history of NSTEMI's with nonobstructive coronary disease on prior coronary angiograms and elevated lipoprotein(a) I would like to target a lower LDL cholesterol.  There is some discrepancy between her coronary angiograms in 2010 and her study in 2015.  Her most recent study notes normal coronary arteries while the prior studies report nonobstructive disease.  We will obtain a coronary calcium score to help clarify her plaque burden.  She will remain on atorvastatin.  We will add ezetimibe.  Potential side effects reviewed today.  She agrees with this plan.  All first-degree family member should have screening lipoprotein(a) levels checked.     Hypertension:  Blood pressure reasonably controlled today.  She will continue to monitor her blood pressure intermittently at home, contacting us for readings consistently greater than 130/80.  I will continue her current regimen.     Family history of premature coronary  artery disease:  Aggressive risk factor modification will be continued.       It was my pleasure to visit with Melly Couch in clinic today.  She will follow up with me in 6 months.  Please do not hesitate to contact me with any questions.      Sincerely,         ________________  Nirav Don MD

## 2022-03-31 ENCOUNTER — HOSPITAL ENCOUNTER (OUTPATIENT)
Dept: RADIOLOGY | Facility: HOSPITAL | Age: 67
Discharge: HOME | End: 2022-03-31
Attending: INTERNAL MEDICINE

## 2022-03-31 DIAGNOSIS — E78.00 HYPERCHOLESTEROLEMIA: ICD-10-CM

## 2022-03-31 PROCEDURE — 75571 CT HRT W/O DYE W/CA TEST: CPT | Mod: MG

## 2022-04-01 ENCOUNTER — TELEPHONE (OUTPATIENT)
Dept: CARDIOLOGY | Facility: CLINIC | Age: 67
End: 2022-04-01
Payer: COMMERCIAL

## 2022-04-01 NOTE — TELEPHONE ENCOUNTER
Spoke with Melly about her coronary calcium score.  Her score was 0, suggesting that her prior cardiac events were probably related to spasm rather than plaque rupture.  She did have atherosclerosis of her aorta as well as emphysematous changes in her lungs.  We talked about the latter in detail.  She is a prior smoker.  We will send a copy of her coronary calcium score to her primary care physician.  I urged her to follow-up with him about these results and next steps.  She was in agreement.  All questions answered.  We will repeat a lipid panel in the coming months now that she is on ezetimibe.

## 2022-09-03 LAB
CHOLEST SERPL-MCNC: 183 MG/DL
CHOLEST/HDLC SERPL: 1.8 (CALC)
HDLC SERPL-MCNC: 104 MG/DL
LDLC SERPL CALC-MCNC: 65 MG/DL (CALC)
NONHDLC SERPL-MCNC: 79 MG/DL (CALC)
TRIGL SERPL-MCNC: 61 MG/DL

## 2022-09-13 ENCOUNTER — OFFICE VISIT (OUTPATIENT)
Dept: CARDIOLOGY | Facility: CLINIC | Age: 67
End: 2022-09-13
Payer: COMMERCIAL

## 2022-09-13 VITALS
HEIGHT: 66 IN | OXYGEN SATURATION: 99 % | WEIGHT: 157 LBS | DIASTOLIC BLOOD PRESSURE: 80 MMHG | BODY MASS INDEX: 25.23 KG/M2 | SYSTOLIC BLOOD PRESSURE: 108 MMHG | HEART RATE: 88 BPM

## 2022-09-13 DIAGNOSIS — I20.1 CORONARY VASOSPASM (CMS/HCC): Primary | ICD-10-CM

## 2022-09-13 DIAGNOSIS — E78.41 ELEVATED LIPOPROTEIN(A): ICD-10-CM

## 2022-09-13 DIAGNOSIS — R93.89 ABNORMAL FINDING ON CT SCAN: ICD-10-CM

## 2022-09-13 DIAGNOSIS — E78.00 HYPERCHOLESTEROLEMIA: ICD-10-CM

## 2022-09-13 DIAGNOSIS — I10 PRIMARY HYPERTENSION: ICD-10-CM

## 2022-09-13 DIAGNOSIS — R60.0 LOWER EXTREMITY EDEMA: ICD-10-CM

## 2022-09-13 PROCEDURE — 93000 ELECTROCARDIOGRAM COMPLETE: CPT | Performed by: INTERNAL MEDICINE

## 2022-09-13 PROCEDURE — 99214 OFFICE O/P EST MOD 30 MIN: CPT | Performed by: INTERNAL MEDICINE

## 2022-09-13 PROCEDURE — 3008F BODY MASS INDEX DOCD: CPT | Performed by: INTERNAL MEDICINE

## 2022-09-13 RX ORDER — BUDESONIDE AND FORMOTEROL FUMARATE DIHYDRATE 160; 4.5 UG/1; UG/1
2 AEROSOL RESPIRATORY (INHALATION) DAILY
COMMUNITY
Start: 2022-07-20

## 2022-09-13 NOTE — PROGRESS NOTES
Nirav Don MD  Cardiology    [facility]    [facility]  [facility] [address] [facility]  [address] [place] [address]    TEL  [phone]  [facility]     09/13/22     Dear Dr. London:    It was my pleasure to see Ms. Melly Couch at the [facility] today for follow-up.      As you know, she is a 67-year-old former smoker with a history of two small non-STEMI's (3/10 & 12/10) and episode of ST elevation associated coronary spasm with entirely normal coronary arteries and small troponin leak in ('15), hypercholesterolemia, elevated lipoprotein (a), and a history of an iodinated contrast allergy.    Following her last visit a coronary calcium score was obtained. Zetia was started.  She has an appointment with pulmonology to discuss her emphysematous changes by CT.  She has had no side effects to ezetimibe.  She underwent spinal fusion in the spring and has slowly increased her exercise tolerance.  She is now swimming.  She gained 9 pounds over the spring/summer.  She has had intermittent lower extremity edema.  Typically worse with prolonged standing.  No orthopnea or PND.  No chest pain or pressure.  No dyspnea at rest with activity.  No lightheadedness or syncope.  She is compliant with her medications.  No specific concerns or complaints today.    Past Medical History:  Past Medical History:   Diagnosis Date    Allergy to intravenous contrast media 1/28/2014    Overview:     Asthma 1/27/2014    Overview:     Coronary atherosclerosis of native coronary artery 1/27/2014    Overview:     GERD (gastroesophageal reflux disease) 1/27/2014    Overview:     Hypercholesterolemia 1/27/2014    Overview:     Hypertension 1/27/2014    Overview:     Old myocardial infarction 1/27/2014    Overview: small non-STEMI X 2 and STEMI associated with spasm X 1    ST elevation myocardial infarction (STEMI) (CMS/[facility]) 6/30/2015    Overview:        Past  Surgical History:  Past Surgical History:   Procedure Laterality Date    BACK SURGERY  06/25/2015    Lumbar Foraminotomy Ls-S1    CARDIAC CATHETERIZATION  03/2010    non-STEMI    CARDIAC CATHETERIZATION  12/2010    small non-STEMI    CARDIAC CATHETERIZATION  06/09/2015    STAMI associated with spasm and normal coronaries    LUMBAR FUSION  03/13/2019    L4-L5 & L5-S1    LUMBAR LAMINECTOMY  03/13/2019    L3-L4 with spinal fusion    LUMBAR SPINE SURGERY  06/25/2015    Hemiectomy & Discectomy    SINUS SURGERY  1991    SPINAL FUSION  04/21/2022       Medications:  Current Outpatient Medications   Medication Sig Dispense Refill    albuterol HFA (VENTOLIN HFA) 90 mcg/actuation inhaler Inhale 2 puffs every 6 (six) hours as needed for shortness of breath.        ALPRAZolam (XANAX) 0.25 mg tablet Take 0.25 mg by mouth daily as needed.      amLODIPine (NORVASC) 10 mg tablet Take 10 mg by mouth daily.        aspirin 81 mg enteric coated tablet Take 81 mg by mouth daily.      atorvastatin (LIPITOR) 80 mg tablet Take 80 mg by mouth daily.      calcium carbonate-mag hydroxid 1,000-200 mg tablet,chewable Take by mouth.      cholecalciferol, vitamin D3, 1,000 unit (25 mcg) tablet Take 1,000 Units by mouth daily.      ezetimibe (ZETIA) 10 mg tablet Take 1 tablet (10 mg total) by mouth nightly. 30 tablet 6    isosorbide mononitrate (IMDUR) 30 mg 24 hr tablet Take 30 mg by mouth daily.        losartan (COZAAR) 25 mg tablet Take 1 tablet (25 mg total) by mouth daily. 90 tablet 5    magnesium oxide 500 mg capsule Take 250 mg by mouth daily.      nitroglycerin (NITROLINGUAL) 400 mcg/spray spray Place 1 spray under the tongue every 5 (five) minutes as needed for chest pain. 12 g 4    omeprazole 20 mg tablet Take 20 mg by mouth daily.      SYMBICORT 160-4.5 mcg/actuation inhaler       vitamin B complex 100  2-herbs 100 mg tablet Take 1 tablet by mouth once daily.       No current facility-administered medications for  this visit.       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  She lives with her .  They have 2 children.  She is a former smoker, quitting in 1996.  She drinks approximately 2 glasses of wine 5 days/week.  No recreational drugs.    Family History: Brother with an MI at 44.  Sister with a PCI at 70.  Father with heart failure in his 70s.  Maternal grandfather with an MI at 55.      Review of Systems: A complete 14-point review of systems is negative, except as noted in the HPI.    Exam:  Objective   Vitals:    09/13/22 1019   BP: 108/80   Pulse: 88   SpO2: 99%     Wt Readings from Last 3 Encounters:   09/13/22 71.2 kg (157 lb)   03/11/22 70.8 kg (156 lb 2 oz)   07/30/21 67.8 kg (149 lb 8 oz)     Body mass index is 25.34 kg/m².  Constitutional: Appears comfortable.   Eyes: No icterus.   ENT: Deferred. Patient wearing a mask.   Neck: No jugular venous distention.  Vascular: No carotid bruits. Radial pulses intact and equal.  Cardiac: Normal S1 and S2, regular rhythm. No murmurs, rubs, or gallops appreciated.  Lungs: Clear to auscultation bilaterally.    GI: Soft, normoactive bowel sounds.  Extremities: Warm. No lower extremity edema.   Skin: Dry.   Neurologic: Awake, alert, oriented.    Psychiatric: No agitation.    Labs: Personally reviewed and discussed with the patient.  Notable for the following.   Lab Results   Component Value Date    LDLCALC 65 09/02/2022    NONHDLCHOL 79 09/02/2022    CHOL 183 09/02/2022    TRIG 61 09/02/2022     09/02/2022    CHOLHDLRAT 1.8 09/02/2022    HGBA1C 5.3 09/14/2015     09/01/2020    K 4.5 09/01/2020    BUN 8 09/01/2020    CREATININE 0.64 09/01/2020    WBC 6.0 02/18/2019    HGB 15.3 02/18/2019     02/18/2019   Lp(a) 212 nmol/L    Cardiovascular Studies:   1. Coronary angiogram, 3/2010: High-grade stenosis of  the fifth right PLB  2. Coronary angiogram, 12/2010: Diffuse, nonobstructive disease.  Small caliber distal vessels.  LV gram LVEF 45-50% with apical akinesis.  3. Exercise nuclear stress, 12/2013: Small, fixed anterior defect could be artifact.  No ischemia.  Good exercise tolerance.  4. Coronary angiogram, 6/2015: Normal coronary arteries, normal LV systolic function  5. CAC, 3/2022: Composite 0. Calcification of the descending throacic aorta.    ECG from today personally reviewed and discussed with the patient shows sinus rhythm nonspecific T wave abnormality and low voltage.  No significant change compared with tracing from  3/11/2022.    Assessment/Plan   Lower extremity edema:  She is euvolemic on exam today.  I suspect venous insufficiency as the primary cause, though we will obtain an updated echocardiogram for completeness.  She knows to contact me should she have recurrent symptoms.  Discussed conservative treatment for venous insufficiency.    Coronary vasospasm-status post non-STEMIs 3/10, 12/10, 6/15:   It is suspected that all of her events were related to episodes of coronary vasospasm with normal coronary arteries.  This is further supported by her calcium score of 0.  She has been free of recurrent events for the last several years on her current regimen which will be continued including amlodipine and isosorbide mononitrate. She will also continue aspirin and lipid-lowering therapy.  She knows to contact me should she develop recurrent symptoms.     Hypercholesterolemia/elevated lipoprotein(a):   Her cholesterol parameters are well controlled on her current therapy.  She will continue atorvastatin and ezetimibe.  I recommended that all first-degree family have screening lipoprotein(a) levels checked.     Hypertension:  Blood pressure controlled today.  She will continue to monitor her blood pressure intermittently at home, contacting us for readings consistently greater than 130/80.  I will  continue her current regimen.     Family history of premature coronary artery disease:  Aggressive risk factor modification will be continued.     Emphysematous changes on CT:  Noted on her coronary calcium score.  She has an appointment with pulmonology in November.      It was my pleasure to visit with Melly Couch in clinic today.  She will follow up with me in 6 months.  Please do not hesitate to contact me with any questions.      Sincerely,       ________________  Nirav Don MD

## 2022-10-15 DIAGNOSIS — I25.10 ATHEROSCLEROSIS OF NATIVE CORONARY ARTERY OF NATIVE HEART WITHOUT ANGINA PECTORIS: ICD-10-CM

## 2022-10-15 DIAGNOSIS — E78.00 HYPERCHOLESTEROLEMIA: ICD-10-CM

## 2022-10-18 RX ORDER — EZETIMIBE 10 MG/1
TABLET ORAL
Qty: 30 TABLET | Refills: 6 | Status: SHIPPED | OUTPATIENT
Start: 2022-10-18 | End: 2023-05-08

## 2022-11-01 ENCOUNTER — TELEPHONE (OUTPATIENT)
Dept: SCHEDULING | Facility: CLINIC | Age: 67
End: 2022-11-01
Payer: COMMERCIAL

## 2022-11-01 NOTE — TELEPHONE ENCOUNTER
Pt called, wants to reschedule 11/16 TTE for soonest available. Pt has another appt that same day with her spinal doctor and cannot make current appt.    Pt can be reached at [phone]

## 2022-11-22 DIAGNOSIS — E78.00 HYPERCHOLESTEROLEMIA: ICD-10-CM

## 2022-11-22 DIAGNOSIS — I25.10 ATHEROSCLEROSIS OF NATIVE CORONARY ARTERY OF NATIVE HEART WITHOUT ANGINA PECTORIS: ICD-10-CM

## 2022-11-22 RX ORDER — EZETIMIBE 10 MG/1
TABLET ORAL
Qty: 30 TABLET | Refills: 6 | Status: CANCELLED | OUTPATIENT
Start: 2022-11-22

## 2022-11-22 NOTE — TELEPHONE ENCOUNTER
Spoke with pharmacist. Patient filled this medication on the 16th. He confirmed receipt of 10/18 script. Cancelled refill request. Notified the patient.

## 2022-11-22 NOTE — TELEPHONE ENCOUNTER
Medicine Refill Request    Last Office: Visit date not found   Last Consult Visit: Visit date not found  Last Telemedicine Visit: Visit date not found    Next Appointment: Visit date not found  ezetimibe (ZETIA) 10 mg tablet  Out of meds    Current Outpatient Medications:     albuterol HFA (VENTOLIN HFA) 90 mcg/actuation inhaler, Inhale 2 puffs every 6 (six) hours as needed for shortness of breath.  , Disp: , Rfl:     ALPRAZolam (XANAX) 0.25 mg tablet, Take 0.25 mg by mouth daily as needed., Disp: , Rfl:     amLODIPine (NORVASC) 10 mg tablet, Take 10 mg by mouth daily.  , Disp: , Rfl:     aspirin 81 mg enteric coated tablet, Take 81 mg by mouth daily., Disp: , Rfl:     atorvastatin (LIPITOR) 80 mg tablet, Take 80 mg by mouth daily., Disp: , Rfl:     calcium carbonate-mag hydroxid 1,000-200 mg tablet,chewable, Take by mouth., Disp: , Rfl:     cholecalciferol, vitamin D3, 1,000 unit (25 mcg) tablet, Take 1,000 Units by mouth daily., Disp: , Rfl:     ezetimibe (ZETIA) 10 mg tablet, TAKE 1 TABLET EACH EVENING, Disp: 30 tablet, Rfl: 6    isosorbide mononitrate (IMDUR) 30 mg 24 hr tablet, Take 30 mg by mouth daily.  , Disp: , Rfl:     losartan (COZAAR) 25 mg tablet, Take 1 tablet (25 mg total) by mouth daily., Disp: 90 tablet, Rfl: 5    magnesium oxide 500 mg capsule, Take 250 mg by mouth daily., Disp: , Rfl:     nitroglycerin (NITROLINGUAL) 400 mcg/spray spray, Place 1 spray under the tongue every 5 (five) minutes as needed for chest pain., Disp: 12 g, Rfl: 4    omeprazole 20 mg tablet, Take 20 mg by mouth daily., Disp: , Rfl:     SYMBICORT 160-4.5 mcg/actuation inhaler, , Disp: , Rfl:     vitamin B complex 100  2-herbs 100 mg tablet, Take 1 tablet by mouth once daily., Disp: , Rfl:       BP Readings from Last 3 Encounters:   09/13/22 108/80   03/11/22 120/72   07/30/21 128/72       Recent Lab results:  Lab Results   Component Value Date    CHOL 183 09/02/2022   ,   Lab Results   Component Value Date      09/02/2022   ,   Lab Results   Component Value Date    LDLCALC 65 09/02/2022   ,   Lab Results   Component Value Date    TRIG 61 09/02/2022        Lab Results   Component Value Date    GLUCOSE 86 09/01/2020   ,   Lab Results   Component Value Date    HGBA1C 5.3 09/14/2015         Lab Results   Component Value Date    CREATININE 0.64 09/01/2020       No results found for: TSH

## 2023-01-06 LAB
ALBUMIN SERPL-MCNC: 4 G/DL (ref 3.6–5.1)
ALBUMIN/GLOB SERPL: 1.9 (CALC) (ref 1–2.5)
ALP SERPL-CCNC: 65 U/L (ref 37–153)
ALT SERPL-CCNC: 29 U/L (ref 6–29)
AST SERPL-CCNC: 25 U/L (ref 10–35)
BILIRUB SERPL-MCNC: 0.7 MG/DL (ref 0.2–1.2)
BUN SERPL-MCNC: 14 MG/DL (ref 7–25)
BUN/CREAT SERPL: NORMAL (CALC) (ref 6–22)
CALCIUM SERPL-MCNC: 9.5 MG/DL (ref 8.6–10.4)
CHLORIDE SERPL-SCNC: 101 MMOL/L (ref 98–110)
CHOLEST SERPL-MCNC: 194 MG/DL
CHOLEST/HDLC SERPL: 2 (CALC)
CO2 SERPL-SCNC: 31 MMOL/L (ref 20–32)
CREAT SERPL-MCNC: 0.68 MG/DL (ref 0.5–1.05)
EGFRCR SERPLBLD CKD-EPI 2021: 96 ML/MIN/1.73M2
GLOBULIN SER CALC-MCNC: 2.1 G/DL (CALC) (ref 1.9–3.7)
GLUCOSE SERPL-MCNC: 73 MG/DL (ref 65–99)
HDLC SERPL-MCNC: 98 MG/DL
LDLC SERPL CALC-MCNC: 75 MG/DL (CALC)
NONHDLC SERPL-MCNC: 96 MG/DL (CALC)
POTASSIUM SERPL-SCNC: 3.9 MMOL/L (ref 3.5–5.3)
PROT SERPL-MCNC: 6.1 G/DL (ref 6.1–8.1)
SODIUM SERPL-SCNC: 138 MMOL/L (ref 135–146)
TRIGL SERPL-MCNC: 120 MG/DL

## 2023-03-22 ENCOUNTER — OFFICE VISIT (OUTPATIENT)
Dept: CARDIOLOGY | Facility: CLINIC | Age: 68
End: 2023-03-22
Payer: COMMERCIAL

## 2023-03-22 VITALS
WEIGHT: 157 LBS | DIASTOLIC BLOOD PRESSURE: 80 MMHG | HEART RATE: 82 BPM | SYSTOLIC BLOOD PRESSURE: 114 MMHG | HEIGHT: 66 IN | OXYGEN SATURATION: 97 % | BODY MASS INDEX: 25.23 KG/M2

## 2023-03-22 DIAGNOSIS — E78.00 HYPERCHOLESTEROLEMIA: ICD-10-CM

## 2023-03-22 DIAGNOSIS — R60.0 LOWER EXTREMITY EDEMA: ICD-10-CM

## 2023-03-22 DIAGNOSIS — I10 PRIMARY HYPERTENSION: ICD-10-CM

## 2023-03-22 DIAGNOSIS — R93.89 ABNORMAL FINDING ON CT SCAN: ICD-10-CM

## 2023-03-22 DIAGNOSIS — I20.1 CORONARY VASOSPASM (CMS/HCC): Primary | ICD-10-CM

## 2023-03-22 DIAGNOSIS — E78.41 ELEVATED LIPOPROTEIN(A): ICD-10-CM

## 2023-03-22 PROCEDURE — 3008F BODY MASS INDEX DOCD: CPT | Performed by: INTERNAL MEDICINE

## 2023-03-22 PROCEDURE — 3079F DIAST BP 80-89 MM HG: CPT | Performed by: INTERNAL MEDICINE

## 2023-03-22 PROCEDURE — 3074F SYST BP LT 130 MM HG: CPT | Performed by: INTERNAL MEDICINE

## 2023-03-22 PROCEDURE — 99214 OFFICE O/P EST MOD 30 MIN: CPT | Performed by: INTERNAL MEDICINE

## 2023-03-22 PROCEDURE — 93000 ELECTROCARDIOGRAM COMPLETE: CPT | Performed by: INTERNAL MEDICINE

## 2023-03-22 NOTE — PROGRESS NOTES
Nirav Don MD  Cardiology    [facility]    [facility]  [facility] [address]  [address] [place] [address]    TEL  [phone]  [facility]     03/22/23     Dear Dr. Wilhelm:    It was my pleasure to see Ms. Melly Couch at the [facility] today for follow-up.      As you know, she is a 67-year-old former smoker with a history of two small non-STEMI's (3/10 & 12/10) and episode of ST elevation associated coronary spasm with entirely normal coronary arteries and small troponin leak in ('15), hypercholesterolemia, elevated lipoprotein (a), and a history of an iodinated contrast allergy.    Melly has done well overall from a cardiopulmonary perspective since her last visit.  She establish care with a pulmonologist.  She has not yet scheduled her pulmonary function testing.  She continues to struggle with back pain and as a result has been unable to exercise as she would like.  Her diet is stable.  She has gained some weight.  She is compliant with her medications and reports no side effects.  She has had no chest pain or pressure.  No consistent dyspnea.  She continues to have intermittent lower extremity edema that is worse after prolonged standing or sitting.  No orthopnea or PND.  She has no specific concerns or complaints today.    She will be retiring this Friday.  Her  retires this summer and they are planning a  river boat cruise in the fall.    Past Medical History:  Past Medical History:   Diagnosis Date   • Allergy to intravenous contrast media 1/28/2014    Overview:    • Asthma 1/27/2014    Overview:    • Coronary atherosclerosis of native coronary artery 1/27/2014    Overview:    • GERD (gastroesophageal reflux disease) 1/27/2014    Overview:    • Hypercholesterolemia 1/27/2014    Overview:    • Hypertension 1/27/2014    Overview:    • Old myocardial infarction 1/27/2014    Overview: small non-STEMI  X 2 and STEMI associated with spasm X 1   • ST elevation myocardial infarction (STEMI) (CMS/[facility]) 6/30/2015    Overview:        Past Surgical History:  Past Surgical History:   Procedure Laterality Date   • BACK SURGERY  06/25/2015    Lumbar Foraminotomy Ls-S1   • CARDIAC CATHETERIZATION  03/2010    non-STEMI   • CARDIAC CATHETERIZATION  12/2010    small non-STEMI   • CARDIAC CATHETERIZATION  06/09/2015    STAMI associated with spasm and normal coronaries   • LUMBAR FUSION  03/13/2019    L4-L5 & L5-S1   • LUMBAR LAMINECTOMY  03/13/2019    L3-L4 with spinal fusion   • LUMBAR SPINE SURGERY  06/25/2015    Hemiectomy & Discectomy   • SINUS SURGERY  1991   • SPINAL FUSION  04/21/2022       Medications:  Current Outpatient Medications   Medication Sig Dispense Refill   • albuterol HFA (VENTOLIN HFA) 90 mcg/actuation inhaler Inhale 2 puffs every 6 (six) hours as needed for shortness of breath.       • ALPRAZolam (XANAX) 0.25 mg tablet Take 0.25 mg by mouth daily as needed.     • amLODIPine (NORVASC) 10 mg tablet Take 10 mg by mouth daily.       • aspirin 81 mg enteric coated tablet Take 81 mg by mouth daily.     • atorvastatin (LIPITOR) 80 mg tablet Take 80 mg by mouth daily.     • calcium carbonate-mag hydroxid 1,000-200 mg tablet,chewable Take by mouth.     • cholecalciferol, vitamin D3, 1,000 unit (25 mcg) tablet Take 1,000 Units by mouth daily.     • ezetimibe (ZETIA) 10 mg tablet TAKE 1 TABLET EACH EVENING 30 tablet 6   • isosorbide mononitrate (IMDUR) 30 mg 24 hr tablet Take 30 mg by mouth daily.       • losartan (COZAAR) 25 mg tablet Take 1 tablet (25 mg total) by mouth daily. 90 tablet 5   • magnesium oxide 500 mg capsule Take 500 mg by mouth daily.     • nitroglycerin (NITROLINGUAL) 400 mcg/spray spray PLACE ONE (1) SPRAY UNDER THE TONGUE EVERY FIVE (5) MINUTES AS NEEDED FOR CHEST PAIN 12 g 3   • omeprazole 20 mg tablet Take 20 mg by mouth daily.     • SYMBICORT 160-4.5 mcg/actuation inhaler Inhale 2 puffs once  daily.     • vitamin B complex 100  2-herbs 100 mg tablet Take 1 tablet by mouth once daily.       No current facility-administered medications for this visit.       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  She lives with her .  They have 2 children.  She is a former smoker, quitting in 1996.  She drinks approximately 2 glasses of wine 5 days/week.  No recreational drugs.    Family History: Brother with an MI at 44.  Sister with a PCI at 70.  Father with heart failure in his 70s.  Maternal grandfather with an MI at 55.      Review of Systems: A complete 14-point review of systems is negative, except as noted in the HPI.    Exam:  Objective   Vitals:    03/22/23 0958   BP: 114/80   Pulse: 82   SpO2: 97%     Wt Readings from Last 3 Encounters:   03/22/23 71.2 kg (157 lb)   09/13/22 71.2 kg (157 lb)   03/11/22 70.8 kg (156 lb 2 oz)     Body mass index is 25.34 kg/m².  Constitutional: Appears comfortable.   Eyes: No icterus.   ENT: Deferred. Patient wearing a mask.   Neck: No jugular venous distention.  Vascular: No carotid bruits. Radial pulses intact and equal.  Cardiac: Normal S1 and S2, regular rhythm. No murmurs, rubs, or gallops appreciated.  Lungs: Clear to auscultation bilaterally.    GI: Soft, normoactive bowel sounds.  Extremities: Warm. No lower extremity edema.   Skin: Dry.   Neurologic: Awake, alert, oriented.    Psychiatric: No agitation.    Labs: Personally reviewed and discussed with the patient.  Notable for the following.   Lab Results   Component Value Date    LDLCALC 75 01/05/2023    NONHDLCHOL 96 01/05/2023    CHOL 194 01/05/2023    TRIG 120 01/05/2023    HDL 98 01/05/2023    CHOLHDLRAT 2.0 01/05/2023    HGBA1C 5.3 09/14/2015     01/05/2023    K 3.9 01/05/2023    BUN 14 01/05/2023    CREATININE 0.68 01/05/2023    WBC 6.0 02/18/2019     HGB 15.3 02/18/2019     02/18/2019     Lab Results   Component Value Date    ALT 29 01/05/2023    AST 25 01/05/2023    ALKPHOS 65 01/05/2023    BILITOT 0.7 01/05/2023       Lp(a) 212 nmol/L    Cardiovascular Studies:   1. Coronary angiogram, 3/2010: High-grade stenosis of the fifth right PLB  2. Coronary angiogram, 12/2010: Diffuse, nonobstructive disease.  Small caliber distal vessels.  LV gram LVEF 45-50% with apical akinesis.  3. Exercise nuclear stress, 12/2013: Small, fixed anterior defect could be artifact.  No ischemia.  Good exercise tolerance.  4. Coronary angiogram, 6/2015: Normal coronary arteries, normal LV systolic function  5. CAC, 3/2022: Composite 0. Calcification of the descending throacic aorta.  6.  TTE, 1/2023 ([facility]): Normal LV size and function.  LVEF 60%.  Grade 1 diastolic dysfunction.  Normal RV size/function.  Normal atrial sizes.  Aortic valve sclerosis without stenosis.  Mild TR.  RVSP 26.  IVC normal.    ECG from today personally reviewed and discussed with the patient shows sinus rhythm, poor R wave progression and low voltage.  No significant change compared with tracing from  3/11/2022.    Assessment/Plan   Lower extremity edema:  She is euvolemic on exam today.  I suspect venous insufficiency as the primary cause.  Echocardiogram largely unremarkable.  She knows to contact me should she have recurrent symptoms.  Discussed conservative treatment for venous insufficiency.    Coronary vasospasm-status post non-STEMI’s 3/10, 12/10, 6/15:   It is suspected that all of her events were related to episodes of coronary vasospasm with normal coronary arteries.  This is further supported by her calcium score of 0.  She has been free of recurrent events for the last several years on her current regimen which will be continued including amlodipine and isosorbide mononitrate. She will also continue aspirin and lipid-lowering therapy.  She knows to contact me should she develop  recurrent symptoms.     Hypercholesterolemia/elevated lipoprotein(a):   Her cholesterol parameters are reasonably controlled.  Her LDL increase is likely related to less aerobic exercise.  She hopes to get back to her prior exercise habits with improvement in her back pain.  I made no changes to her lipid-lowering regimen.  I previously recommended that all first-degree family members have screening LP(a) levels checked.      Hypertension:  Blood pressure controlled today.  She will continue to monitor her blood pressure intermittently at home, contacting us for readings consistently greater than 130/80.  I will continue her current regimen.     Family history of premature coronary artery disease:  Aggressive risk factor modification will be continued.     Emphysematous changes on CT:  Noted on her coronary calcium score.  She has establish care with a pulmonologist.       It was my pleasure to visit with Melly Couch in clinic today.  She will follow up with me in 6 months.  Please do not hesitate to contact me with any questions.      Sincerely,       ________________  Nirav Don MD

## 2023-05-08 DIAGNOSIS — E78.00 HYPERCHOLESTEROLEMIA: ICD-10-CM

## 2023-05-08 DIAGNOSIS — I25.10 ATHEROSCLEROSIS OF NATIVE CORONARY ARTERY OF NATIVE HEART WITHOUT ANGINA PECTORIS: ICD-10-CM

## 2023-05-08 RX ORDER — EZETIMIBE 10 MG/1
TABLET ORAL
Qty: 30 TABLET | Refills: 6 | Status: SHIPPED | OUTPATIENT
Start: 2023-05-08 | End: 2024-02-06

## 2023-11-29 LAB
CHOLEST SERPL-MCNC: 172 MG/DL
CHOLEST/HDLC SERPL: 1.8 (CALC)
HDLC SERPL-MCNC: 98 MG/DL
LDLC SERPL CALC-MCNC: 60 MG/DL (CALC)
NONHDLC SERPL-MCNC: 74 MG/DL (CALC)
TRIGL SERPL-MCNC: 59 MG/DL

## 2023-12-01 ENCOUNTER — TELEPHONE (OUTPATIENT)
Dept: CARDIOLOGY | Facility: CLINIC | Age: 68
End: 2023-12-01

## 2023-12-01 ENCOUNTER — OFFICE VISIT (OUTPATIENT)
Dept: CARDIOLOGY | Facility: CLINIC | Age: 68
End: 2023-12-01
Payer: MEDICARE

## 2023-12-01 VITALS
OXYGEN SATURATION: 98 % | DIASTOLIC BLOOD PRESSURE: 82 MMHG | SYSTOLIC BLOOD PRESSURE: 122 MMHG | BODY MASS INDEX: 26.03 KG/M2 | HEART RATE: 92 BPM | WEIGHT: 162 LBS | HEIGHT: 66 IN

## 2023-12-01 DIAGNOSIS — E78.00 HYPERCHOLESTEROLEMIA: ICD-10-CM

## 2023-12-01 DIAGNOSIS — I20.1 CORONARY VASOSPASM (CMS/HCC): Primary | ICD-10-CM

## 2023-12-01 DIAGNOSIS — E78.41 ELEVATED LIPOPROTEIN(A): ICD-10-CM

## 2023-12-01 DIAGNOSIS — I10 PRIMARY HYPERTENSION: ICD-10-CM

## 2023-12-01 DIAGNOSIS — Z82.49 FAMILY HISTORY OF ISCHEMIC HEART DISEASE: ICD-10-CM

## 2023-12-01 PROCEDURE — 200200 PR NO CHARGE: Performed by: INTERNAL MEDICINE

## 2023-12-01 PROCEDURE — 99214 OFFICE O/P EST MOD 30 MIN: CPT | Performed by: INTERNAL MEDICINE

## 2023-12-01 PROCEDURE — 93000 ELECTROCARDIOGRAM COMPLETE: CPT | Performed by: INTERNAL MEDICINE

## 2023-12-01 RX ORDER — NITROGLYCERIN 400 UG/1
1 SPRAY ORAL EVERY 5 MIN PRN
Qty: 12 G | Refills: 3 | Status: SHIPPED | OUTPATIENT
Start: 2023-12-01 | End: 2024-10-31 | Stop reason: SDUPTHER

## 2023-12-01 NOTE — PROGRESS NOTES
Nirav Don MD  Cardiology    [facility]    [facility]  [facility] [facility]  [address] [place] [address]    TEL  [phone]  [facility]     12/01/23     Dear Dr. Wilhelm:    It was my pleasure to see Ms. Melly Couch at the [facility] today for follow-up.      As you know, she is a 68-year-old former smoker with a history of two small non-STEMI's (3/10 & 12/10) and episode of ST elevation associated coronary spasm with entirely normal coronary arteries and small troponin leak in ('15), hypercholesterolemia, elevated lipoprotein (a), and a history of an iodinated contrast allergy.    Melly has done well overall from a cardiopulmonary perspective since her last visit. She continues to struggle with back pain and as a result has been unable to exercise as she would like.  She still swims intermittently without any cardiopulmonary symptoms.  Her diet is stable. She is compliant with her medications and reports no side effects.  She has had no chest pain or pressure.  No dyspnea.  No lower extremity edema, orthopnea, or PND.  No concerns or complaints today.      Past Medical History:  Past Medical History:   Diagnosis Date   • Allergy to intravenous contrast media 1/28/2014    Overview:    • Asthma 1/27/2014    Overview:    • Coronary atherosclerosis of native coronary artery 1/27/2014    Overview:    • GERD (gastroesophageal reflux disease) 1/27/2014    Overview:    • Hypercholesterolemia 1/27/2014    Overview:    • Hypertension 1/27/2014    Overview:    • Old myocardial infarction 1/27/2014    Overview: small non-STEMI X 2 and STEMI associated with spasm X 1   • ST elevation myocardial infarction (STEMI) (CMS/HCC) 6/30/2015    Overview:      Past Surgical History:  Past Surgical History:   Procedure Laterality Date   • BACK SURGERY  06/25/2015    Lumbar Foraminotomy Ls-S1   • CARDIAC CATHETERIZATION  03/2010     non-STEMI   • CARDIAC CATHETERIZATION  12/2010    small non-STEMI   • CARDIAC CATHETERIZATION  06/09/2015    STAMI associated with spasm and normal coronaries   • LUMBAR FUSION  03/13/2019    L4-L5 & L5-S1   • LUMBAR LAMINECTOMY  03/13/2019    L3-L4 with spinal fusion   • LUMBAR SPINE SURGERY  06/25/2015    Hemiectomy & Discectomy   • SINUS SURGERY  1991   • SPINAL FUSION  04/21/2022       Medications:  Current Outpatient Medications   Medication Sig Dispense Refill   • albuterol HFA (VENTOLIN HFA) 90 mcg/actuation inhaler Inhale 2 puffs every 6 (six) hours as needed for shortness of breath.       • ALPRAZolam (XANAX) 0.25 mg tablet Take 0.25 mg by mouth daily as needed.     • amLODIPine (NORVASC) 10 mg tablet Take 10 mg by mouth daily.       • aspirin 81 mg enteric coated tablet Take 81 mg by mouth daily.     • atorvastatin (LIPITOR) 80 mg tablet Take 80 mg by mouth daily.     • calcium carbonate-mag hydroxid 1,000-200 mg tablet,chewable Take by mouth.     • cholecalciferol, vitamin D3, 1,000 unit (25 mcg) tablet Take 1,000 Units by mouth daily.     • ezetimibe (ZETIA) 10 mg tablet TAKE ONE (1) TABLET EACH EVENING 30 tablet 6   • isosorbide mononitrate (IMDUR) 30 mg 24 hr tablet Take 30 mg by mouth daily.       • losartan (COZAAR) 25 mg tablet Take 1 tablet (25 mg total) by mouth daily. 90 tablet 5   • magnesium oxide 500 mg capsule Take 500 mg by mouth daily.     • nitroglycerin (NITROLINGUAL) 400 mcg/spray spray Place 1 spray under the tongue every 5 (five) minutes as needed for chest pain. 12 g 3   • omeprazole 20 mg tablet Take 20 mg by mouth daily.     • SYMBICORT 160-4.5 mcg/actuation inhaler Inhale 2 puffs once daily.     • vitamin B complex 100  2-herbs 100 mg tablet Take 1 tablet by mouth once daily.       No current facility-administered medications for this visit.       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  Recently retired from [employer].  She lives with her .  They have 2 children.  She is a former smoker, quitting in 1996.  Social alcohol use.  No recreational drugs.    Family History: Brother with an MI at 44.  Sister with a PCI at 70.  Father with heart failure in his 70s.  Maternal grandfather with an MI at 55.      Review of Systems: A complete 14-point review of systems is negative, except as noted in the HPI.    Exam:  Objective   Vitals:    12/01/23 1013   BP: 122/82   Pulse: 92   SpO2: 98%     Wt Readings from Last 3 Encounters:   12/01/23 73.5 kg (162 lb)   03/22/23 71.2 kg (157 lb)   09/13/22 71.2 kg (157 lb)     Body mass index is 26.15 kg/m².  Constitutional: Appears comfortable.   Eyes: No icterus.   ENT: Deferred.  Neck: No jugular venous distention.  Vascular: No carotid bruits.   Cardiac: Normal S1 and S2, regular rhythm. No murmurs, rubs, or gallops appreciated.  Lungs: Clear to auscultation bilaterally.    GI: Soft, normoactive bowel sounds.  Extremities: Warm. No lower extremity edema.   Skin: Dry.   Neurologic: Awake, alert, oriented.    Psychiatric: No agitation.    Labs: Personally reviewed and discussed with the patient.  Notable for the following.   Lab Results   Component Value Date    LDLCALC 60 11/28/2023    NONHDLCHOL 74 11/28/2023    CHOL 172 11/28/2023    TRIG 59 11/28/2023    HDL 98 11/28/2023    CHOLHDLRAT 1.8 11/28/2023    HGBA1C 5.3 03/22/2022     01/05/2023    K 3.9 01/05/2023    BUN 14 01/05/2023    CREATININE 0.68 01/05/2023    WBC 6.0 02/18/2019    HGB 15.3 02/18/2019     02/18/2019     Lab Results   Component Value Date    ALT 29 01/05/2023    AST 25 01/05/2023    ALKPHOS 65 01/05/2023    BILITOT 0.7 01/05/2023     Lp(a) 212 nmol/L    Cardiovascular Studies:   1. Coronary angiogram, 3/2010: High-grade stenosis of the fifth right PLB  2. Coronary angiogram, 12/2010: Diffuse, nonobstructive disease.  Small caliber distal  vessels.  LV gram LVEF 45-50% with apical akinesis.  3. Exercise nuclear stress, 12/2013: Small, fixed anterior defect could be artifact.  No ischemia.  Good exercise tolerance.  4. Coronary angiogram, 6/2015: Normal coronary arteries, normal LV systolic function  5. CAC, 3/2022: Composite 0. Calcification of the descending throacic aorta.  6.  TTE, 1/2023 ([facility]): Normal LV size and function.  LVEF 60%.  Grade 1 diastolic dysfunction.  Normal RV size/function.  Normal atrial sizes.  Aortic valve sclerosis without stenosis.  Mild TR.  RVSP 26.  IVC normal.    ECG from today personally reviewed and discussed with the patient shows sinus rhythm and low voltage.  No significant change compared with tracing from 3/22/2023.      Assessment/Plan   Coronary vasospasm-status post non-STEMI’s 3/10, 12/10, 6/15:   It is suspected that all of her events were related to episodes of coronary vasospasm with normal coronary arteries.  This is further supported by her calcium score of 0.  She has been free of recurrent events for the last several years on her current regimen which will be continued including amlodipine and isosorbide mononitrate. She will also continue aspirin and lipid-lowering therapy.  She knows to contact me should she develop recurrent symptoms.  She has preserved biventricular systolic function.     Hypercholesterolemia/elevated lipoprotein(a):   Her cholesterol parameters are controlled. I made no changes to her lipid-lowering regimen.  I previously recommended that all first-degree family members have screening LP(a) levels checked.      Hypertension:  Blood pressure controlled today.  She will continue to monitor her blood pressure intermittently at home, contacting us for readings consistently greater than 130/80.  I will continue her current regimen.     Family history of premature coronary artery disease:  Aggressive risk factor modification will be continued.     Emphysematous changes on  CT:  Noted on her coronary calcium score.  She has established care with a pulmonologist.       It was my pleasure to visit with Melly Couch in clinic today.  She will follow up with me in 6 months.  Please do not hesitate to contact me with any questions.      Sincerely,       ________________  Nirav Don MD

## 2023-12-01 NOTE — TELEPHONE ENCOUNTER
Upon checking in patient,I attempted to add new medicare insurance, however, the system was frozen waiting for response. Will monitor and update when it responds.

## 2024-02-06 DIAGNOSIS — E78.00 HYPERCHOLESTEROLEMIA: ICD-10-CM

## 2024-02-06 DIAGNOSIS — I25.10 ATHEROSCLEROSIS OF NATIVE CORONARY ARTERY OF NATIVE HEART WITHOUT ANGINA PECTORIS: ICD-10-CM

## 2024-02-06 RX ORDER — EZETIMIBE 10 MG/1
TABLET ORAL
Qty: 30 TABLET | Refills: 6 | Status: SHIPPED | OUTPATIENT
Start: 2024-02-06 | End: 2024-11-19

## 2024-06-03 ENCOUNTER — TELEPHONE (OUTPATIENT)
Dept: SCHEDULING | Facility: CLINIC | Age: 69
End: 2024-06-03

## 2024-06-03 NOTE — TELEPHONE ENCOUNTER
Pt called to r/s  tomorrows appt. Pt said she has pneumonia. Pt would like a call back to r/s     P: [phone]

## 2024-08-15 LAB
ALBUMIN SERPL-MCNC: 4.4 G/DL (ref 3.6–5.1)
ALBUMIN/GLOB SERPL: 2.2 (CALC) (ref 1–2.5)
ALP SERPL-CCNC: 55 U/L (ref 37–153)
ALT SERPL-CCNC: 19 U/L (ref 6–29)
AST SERPL-CCNC: 26 U/L (ref 10–35)
BILIRUB SERPL-MCNC: 0.8 MG/DL (ref 0.2–1.2)
BUN SERPL-MCNC: 9 MG/DL (ref 7–25)
BUN/CREAT SERPL: NORMAL (CALC) (ref 6–22)
CALCIUM SERPL-MCNC: 9.5 MG/DL (ref 8.6–10.4)
CHLORIDE SERPL-SCNC: 101 MMOL/L (ref 98–110)
CHOLEST SERPL-MCNC: 196 MG/DL
CHOLEST/HDLC SERPL: 1.7 (CALC)
CO2 SERPL-SCNC: 27 MMOL/L (ref 20–32)
CREAT SERPL-MCNC: 0.64 MG/DL (ref 0.5–1.05)
EGFRCR SERPLBLD CKD-EPI 2021: 96 ML/MIN/1.73M2
GLOBULIN SER CALC-MCNC: 2 G/DL (CALC) (ref 1.9–3.7)
GLUCOSE SERPL-MCNC: 78 MG/DL (ref 65–99)
HDLC SERPL-MCNC: 115 MG/DL
LDLC SERPL CALC-MCNC: 61 MG/DL (CALC)
NONHDLC SERPL-MCNC: 81 MG/DL (CALC)
POTASSIUM SERPL-SCNC: 4.4 MMOL/L (ref 3.5–5.3)
PROT SERPL-MCNC: 6.4 G/DL (ref 6.1–8.1)
SODIUM SERPL-SCNC: 137 MMOL/L (ref 135–146)
TRIGL SERPL-MCNC: 110 MG/DL

## 2024-08-26 ENCOUNTER — TELEPHONE (OUTPATIENT)
Dept: SCHEDULING | Facility: CLINIC | Age: 69
End: 2024-08-26
Payer: MEDICARE

## 2024-08-26 NOTE — TELEPHONE ENCOUNTER
Patient called wants to R/S 6/4/24 OV. No appt available in next 30 days please advise    Pt also wants to discuss Echo    Patient can be reached at: [phone]

## 2024-10-30 NOTE — PROGRESS NOTES
Nirav Don MD  Cardiology    [facility]    [facility]  [facility] [address] [facility]  [address] [place] [address]    TEL  [phone]  [facility]     10/31/24     Dear Dr. Wilhelm:    It was my pleasure to see Ms. Melly Couch at the [facility] today for follow-up.      As you know, she is a 69-year-old former smoker with a history of two small non-STEMI's (3/10 & 12/10) and episode of ST elevation associated coronary spasm with entirely normal coronary arteries and small troponin leak in ('15), hypercholesterolemia, elevated lipoprotein (a), and a history of an iodinated contrast allergy.    Melly has done well overall from a cardiopulmonary perspective since her last visit.  No significant changes to her diet or exercise routine. She is compliant with her medications and reports no side effects.  She has had no chest pain or pressure.  No dyspnea.  No lower extremity edema, orthopnea, or PND.  No palpitations or syncope.  No concerns or complaints today.      Her  will be undergoing prostate surgery for prostate cancer later this year.    Past Medical History:  Past Medical History:   Diagnosis Date    Allergy to intravenous contrast media 1/28/2014    Overview:     Asthma 1/27/2014    Overview:     Coronary atherosclerosis of native coronary artery 1/27/2014    Overview:     GERD (gastroesophageal reflux disease) 1/27/2014    Overview:     Hypercholesterolemia 1/27/2014    Overview:     Hypertension 1/27/2014    Overview:     Old myocardial infarction 1/27/2014    Overview: small non-STEMI X 2 and STEMI associated with spasm X 1    ST elevation myocardial infarction (STEMI) (CMS/[facility]) 6/30/2015    Overview:      Past Surgical History:  Past Surgical History   Procedure Laterality Date    Back surgery  06/25/2015    Lumbar Foraminotomy Ls-S1    Cardiac catheterization  03/2010    non-STEMI    Cardiac catheterization   12/2010    small non-STEMI    Cardiac catheterization  06/09/2015    STAMI associated with spasm and normal coronaries    Lumbar fusion  03/13/2019    L4-L5 & L5-S1    Lumbar laminectomy  03/13/2019    L3-L4 with spinal fusion    Lumbar spine surgery  06/25/2015    Hemiectomy & Discectomy    Sinus surgery  1991    Spinal fusion  04/21/2022       Medications:  Current Outpatient Medications   Medication Sig Dispense Refill    albuterol HFA (VENTOLIN HFA) 90 mcg/actuation inhaler Inhale 2 puffs every 6 (six) hours as needed for shortness of breath.        ALPRAZolam (XANAX) 0.25 mg tablet Take 0.25 mg by mouth daily as needed.      amLODIPine (NORVASC) 10 mg tablet Take 10 mg by mouth daily.        aspirin 81 mg enteric coated tablet Take 81 mg by mouth daily.      atorvastatin (LIPITOR) 80 mg tablet Take 80 mg by mouth daily.      calcium carbonate-mag hydroxid 1,000-200 mg tablet,chewable Take by mouth.      cholecalciferol, vitamin D3, 1,000 unit (25 mcg) tablet Take 1,000 Units by mouth daily.      ezetimibe (ZETIA) 10 mg tablet TAKE ONE (1) TABLET EACH EVENING 30 tablet 6    isosorbide mononitrate (IMDUR) 30 mg 24 hr tablet Take 30 mg by mouth daily.        losartan (COZAAR) 25 mg tablet Take 1 tablet (25 mg total) by mouth daily. 90 tablet 5    magnesium oxide 500 mg capsule Take 500 mg by mouth daily.      nitroglycerin (NITROLINGUAL) 400 mcg/spray spray Place 1 spray under the tongue every 5 (five) minutes as needed for chest pain. 12 g 3    omeprazole 20 mg tablet Take 20 mg by mouth daily.      SYMBICORT 160-4.5 mcg/actuation inhaler Inhale 2 puffs once daily.      vitamin B complex 100  2-herbs 100 mg tablet Take 1 tablet by mouth once daily.       No current facility-administered medications for this visit.       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  She lives with her .  They have 2 children.  She is a former smoker, quitting in 1996.  Social alcohol use.  No recreational drugs.    Family History: Brother with an MI at 44.  Sister with a PCI at 70.  Father with heart failure in his 70s.  Maternal grandfather with an MI at 55.      Review of Systems: A complete 14-point review of systems is negative, except as noted in the HPI.    Exam:  Objective   Vitals:    10/31/24 1001   BP: 104/68   Pulse: 73   SpO2: 99%       Wt Readings from Last 3 Encounters:   10/31/24 71.3 kg (157 lb 3.2 oz)   12/01/23 73.5 kg (162 lb)   03/22/23 71.2 kg (157 lb)     Body mass index is 25.37 kg/m².  Constitutional: Appears comfortable.   Eyes: No icterus.   ENT: Deferred.  Neck: No jugular venous distention.  Vascular: No carotid bruits.   Cardiac: Normal S1 and S2, regular rhythm. No murmurs, rubs, or gallops appreciated.  Lungs: Clear to auscultation bilaterally.    GI: Nondistended.  Extremities: Warm. No lower extremity edema.   Skin: Dry.   Neurologic: Awake, alert, oriented.    Psychiatric: No agitation.    Labs: Personally reviewed and discussed with the patient.  Notable for the following.   Lab Results   Component Value Date    LDLCALC 61 08/14/2024    NONHDLCHOL 81 08/14/2024    CHOL 196 08/14/2024    TRIG 110 08/14/2024     08/14/2024    CHOLHDLRAT 1.7 08/14/2024    HGBA1C 5.3 03/22/2022     08/14/2024    K 4.4 08/14/2024    BUN 9 08/14/2024    CREATININE 0.64 08/14/2024    WBC 6.0 02/18/2019    HGB 15.3 02/18/2019     02/18/2019     Lab Results   Component Value Date    ALT 19 08/14/2024    AST 26 08/14/2024    ALKPHOS 55 08/14/2024    BILITOT 0.8 08/14/2024     Lp(a) 212 nmol/L    Cardiovascular Studies:   1. Coronary angiogram, 3/2010: High-grade stenosis of the fifth right PLB  2. Coronary angiogram, 12/2010: Diffuse, nonobstructive disease.  Small caliber distal vessels.  LV gram LVEF 45-50% with apical akinesis.  3. Exercise nuclear  stress, 12/2013: Small, fixed anterior defect could be artifact.  No ischemia.  Good exercise tolerance.  4. Coronary angiogram, 6/2015: Normal coronary arteries, normal LV systolic function  5. CAC, 3/2022: Composite 0. Calcification of the descending throacic aorta.  6.  TTE, 1/2023 ([facility]): Normal LV size and function.  LVEF 60%.  Grade 1 diastolic dysfunction.  Normal RV size/function.  Normal atrial sizes.  Aortic valve sclerosis without stenosis.  Mild TR.  RVSP 26.  IVC normal.    ECG from today personally reviewed and discussed with the patient shows sinus rhythm with low voltage and poor R wave progression.      Assessment/Plan   Coronary vasospasm-status post non-STEMI’s 3/10, 12/10, 6/15:   It is suspected that all of her events were related to episodes of coronary vasospasm with normal coronary arteries. This is further supported by her calcium score of 0. She has been free of recurrent events for the last several years on her current regimen which will be continued including amlodipine and isosorbide mononitrate. She will also continue aspirin and lipid-lowering therapy.  She knows to contact me should she develop recurrent symptoms. She has preserved biventricular systolic function.     Hypercholesterolemia/elevated lipoprotein(a):   Her cholesterol parameters are controlled. I made no changes to her lipid-lowering regimen.  I previously recommended that all first-degree family members have screening LP(a) levels checked.      Hypertension:  Blood pressure controlled today.  She will continue to monitor her blood pressure intermittently at home, contacting us for readings consistently greater than 130/80.  I will continue her current regimen.     Family history of premature coronary artery disease:  Aggressive risk factor modification will be continued.     Emphysematous changes on CT:  Noted on her coronary calcium score.  She has established care with a pulmonologist.       It was my  pleasure to visit with Melly Couch in clinic today.  She will follow up with me in 6 months.  Please do not hesitate to contact me with any questions.      Sincerely,       ________________  Nirav Don MD

## 2024-10-31 ENCOUNTER — OFFICE VISIT (OUTPATIENT)
Dept: CARDIOLOGY | Facility: CLINIC | Age: 69
End: 2024-10-31
Payer: MEDICARE

## 2024-10-31 VITALS
OXYGEN SATURATION: 99 % | SYSTOLIC BLOOD PRESSURE: 104 MMHG | WEIGHT: 157.2 LBS | DIASTOLIC BLOOD PRESSURE: 68 MMHG | BODY MASS INDEX: 25.26 KG/M2 | HEART RATE: 73 BPM | HEIGHT: 66 IN

## 2024-10-31 DIAGNOSIS — E78.00 HYPERCHOLESTEROLEMIA: ICD-10-CM

## 2024-10-31 DIAGNOSIS — I10 PRIMARY HYPERTENSION: ICD-10-CM

## 2024-10-31 DIAGNOSIS — E78.41 ELEVATED LIPOPROTEIN(A): ICD-10-CM

## 2024-10-31 DIAGNOSIS — R93.89 ABNORMAL FINDING ON CT SCAN: ICD-10-CM

## 2024-10-31 DIAGNOSIS — I20.1 CORONARY VASOSPASM (CMS/HCC): Primary | ICD-10-CM

## 2024-10-31 LAB
ATRIAL RATE: 86
P AXIS: 60
PR INTERVAL: 166
QRS DURATION: 88
QT INTERVAL: 380
QTC CALCULATION(BAZETT): 454
R AXIS: 39
T WAVE AXIS: 34
VENTRICULAR RATE: 86

## 2024-10-31 PROCEDURE — G8754 DIAS BP LESS 90: HCPCS | Performed by: INTERNAL MEDICINE

## 2024-10-31 PROCEDURE — 93000 ELECTROCARDIOGRAM COMPLETE: CPT | Performed by: INTERNAL MEDICINE

## 2024-10-31 PROCEDURE — G8752 SYS BP LESS 140: HCPCS | Performed by: INTERNAL MEDICINE

## 2024-10-31 PROCEDURE — 99214 OFFICE O/P EST MOD 30 MIN: CPT | Performed by: INTERNAL MEDICINE

## 2024-10-31 RX ORDER — NITROGLYCERIN 400 UG/1
1 SPRAY ORAL EVERY 5 MIN PRN
Qty: 12 G | Refills: 3 | Status: SHIPPED | OUTPATIENT
Start: 2024-10-31

## 2024-11-19 DIAGNOSIS — I25.10 ATHEROSCLEROSIS OF NATIVE CORONARY ARTERY OF NATIVE HEART WITHOUT ANGINA PECTORIS: ICD-10-CM

## 2024-11-19 DIAGNOSIS — E78.00 HYPERCHOLESTEROLEMIA: ICD-10-CM

## 2024-11-19 RX ORDER — EZETIMIBE 10 MG/1
TABLET ORAL
Qty: 90 TABLET | Refills: 3 | Status: SHIPPED | OUTPATIENT
Start: 2024-11-19

## 2024-11-19 NOTE — TELEPHONE ENCOUNTER
Medication renewal received for zetia 10 mg daily. Last OV 10/31/2024. Next OV 5/7/2025. Medication renewal with refills sent to pt's preferred pharmacy after chart review. Thank you.

## 2025-05-02 ENCOUNTER — RESULTS FOLLOW-UP (OUTPATIENT)
Dept: CARDIOLOGY | Facility: CLINIC | Age: 70
End: 2025-05-02
Payer: MEDICARE

## 2025-05-02 DIAGNOSIS — E78.00 HYPERCHOLESTEROLEMIA: Primary | ICD-10-CM

## 2025-05-02 LAB
ALBUMIN SERPL-MCNC: 3.7 G/DL (ref 3.6–5.1)
ALBUMIN/GLOB SERPL: 1.9 (CALC) (ref 1–2.5)
ALP SERPL-CCNC: 52 U/L (ref 37–153)
ALT SERPL-CCNC: 30 U/L (ref 6–29)
AST SERPL-CCNC: 21 U/L (ref 10–35)
BILIRUB SERPL-MCNC: 0.6 MG/DL (ref 0.2–1.2)
BUN SERPL-MCNC: 12 MG/DL (ref 7–25)
BUN/CREAT SERPL: ABNORMAL (CALC) (ref 6–22)
CALCIUM SERPL-MCNC: 9.2 MG/DL (ref 8.6–10.4)
CHLORIDE SERPL-SCNC: 101 MMOL/L (ref 98–110)
CHOLEST SERPL-MCNC: 155 MG/DL
CHOLEST/HDLC SERPL: 2.2 (CALC)
CO2 SERPL-SCNC: 32 MMOL/L (ref 20–32)
CREAT SERPL-MCNC: 0.59 MG/DL (ref 0.5–1.05)
EGFRCR SERPLBLD CKD-EPI 2021: 97 ML/MIN/1.73M2
GLOBULIN SER CALC-MCNC: 2 G/DL (CALC) (ref 1.9–3.7)
GLUCOSE SERPL-MCNC: 88 MG/DL (ref 65–99)
HDLC SERPL-MCNC: 71 MG/DL
LDLC SERPL CALC-MCNC: 70 MG/DL (CALC)
NONHDLC SERPL-MCNC: 84 MG/DL (CALC)
POTASSIUM SERPL-SCNC: 4 MMOL/L (ref 3.5–5.3)
PROT SERPL-MCNC: 5.7 G/DL (ref 6.1–8.1)
SODIUM SERPL-SCNC: 139 MMOL/L (ref 135–146)
TRIGL SERPL-MCNC: 64 MG/DL

## 2025-05-07 ENCOUNTER — OFFICE VISIT (OUTPATIENT)
Dept: CARDIOLOGY | Facility: CLINIC | Age: 70
End: 2025-05-07
Payer: MEDICARE

## 2025-05-07 VITALS
WEIGHT: 152 LBS | SYSTOLIC BLOOD PRESSURE: 120 MMHG | HEART RATE: 106 BPM | HEIGHT: 67 IN | OXYGEN SATURATION: 97 % | BODY MASS INDEX: 23.86 KG/M2 | DIASTOLIC BLOOD PRESSURE: 70 MMHG

## 2025-05-07 DIAGNOSIS — E78.41 ELEVATED LIPOPROTEIN(A): ICD-10-CM

## 2025-05-07 DIAGNOSIS — I10 PRIMARY HYPERTENSION: ICD-10-CM

## 2025-05-07 DIAGNOSIS — R79.89 ABNORMAL LFTS: ICD-10-CM

## 2025-05-07 DIAGNOSIS — R93.89 ABNORMAL FINDING ON CT SCAN: ICD-10-CM

## 2025-05-07 DIAGNOSIS — I20.1 CORONARY VASOSPASM (CMS/HCC): Primary | ICD-10-CM

## 2025-05-07 DIAGNOSIS — E78.00 HYPERCHOLESTEROLEMIA: ICD-10-CM

## 2025-05-07 LAB
ATRIAL RATE: 101
P AXIS: 61
PR INTERVAL: 164
QRS DURATION: 78
QT INTERVAL: 346
QTC CALCULATION(BAZETT): 448
R AXIS: -5
T WAVE AXIS: 44
VENTRICULAR RATE: 101

## 2025-05-07 PROCEDURE — G8754 DIAS BP LESS 90: HCPCS | Performed by: INTERNAL MEDICINE

## 2025-05-07 PROCEDURE — 93000 ELECTROCARDIOGRAM COMPLETE: CPT | Performed by: INTERNAL MEDICINE

## 2025-05-07 PROCEDURE — G8752 SYS BP LESS 140: HCPCS | Performed by: INTERNAL MEDICINE

## 2025-05-07 PROCEDURE — 99214 OFFICE O/P EST MOD 30 MIN: CPT | Performed by: INTERNAL MEDICINE
